# Patient Record
Sex: FEMALE | Race: WHITE | NOT HISPANIC OR LATINO | Employment: UNEMPLOYED | URBAN - METROPOLITAN AREA
[De-identification: names, ages, dates, MRNs, and addresses within clinical notes are randomized per-mention and may not be internally consistent; named-entity substitution may affect disease eponyms.]

---

## 2017-11-14 ENCOUNTER — HOSPITAL ENCOUNTER (OUTPATIENT)
Facility: HOSPITAL | Age: 57
Setting detail: OBSERVATION
Discharge: HOME | End: 2017-11-15
Attending: EMERGENCY MEDICINE | Admitting: INTERNAL MEDICINE

## 2017-11-14 ENCOUNTER — APPOINTMENT (EMERGENCY)
Dept: CT IMAGING | Facility: HOSPITAL | Age: 57
End: 2017-11-14

## 2017-11-14 DIAGNOSIS — N13.30 HYDRONEPHROSIS OF LEFT KIDNEY: ICD-10-CM

## 2017-11-14 DIAGNOSIS — N20.1 LEFT URETERAL STONE: Primary | ICD-10-CM

## 2017-11-14 PROBLEM — Z72.0 TOBACCO ABUSE: Status: ACTIVE | Noted: 2017-11-14

## 2017-11-14 PROBLEM — D72.829 LEUCOCYTOSIS: Status: ACTIVE | Noted: 2017-11-14

## 2017-11-14 PROBLEM — R10.9 ABDOMINAL PAIN: Status: ACTIVE | Noted: 2017-11-14

## 2017-11-14 PROBLEM — E87.1 HYPONATREMIA: Status: ACTIVE | Noted: 2017-11-14

## 2017-11-14 LAB
ALBUMIN SERPL BCP-MCNC: 3.6 G/DL (ref 3.5–5)
ALP SERPL-CCNC: 80 U/L (ref 46–116)
ALT SERPL W P-5'-P-CCNC: 36 U/L (ref 12–78)
ANION GAP SERPL CALCULATED.3IONS-SCNC: 8 MMOL/L (ref 4–13)
AST SERPL W P-5'-P-CCNC: 20 U/L (ref 5–45)
BACTERIA UR QL AUTO: ABNORMAL /HPF
BASOPHILS # BLD AUTO: 0.02 THOUSANDS/ΜL (ref 0–0.1)
BASOPHILS NFR BLD AUTO: 0 % (ref 0–1)
BILIRUB SERPL-MCNC: 0.3 MG/DL (ref 0.2–1)
BILIRUB UR QL STRIP: NEGATIVE
BUN SERPL-MCNC: 13 MG/DL (ref 5–25)
CALCIUM SERPL-MCNC: 9 MG/DL (ref 8.3–10.1)
CHLORIDE SERPL-SCNC: 100 MMOL/L (ref 100–108)
CLARITY UR: CLEAR
CO2 SERPL-SCNC: 27 MMOL/L (ref 21–32)
COLOR UR: YELLOW
CREAT SERPL-MCNC: 0.98 MG/DL (ref 0.6–1.3)
EOSINOPHIL # BLD AUTO: 0.07 THOUSAND/ΜL (ref 0–0.61)
EOSINOPHIL NFR BLD AUTO: 0 % (ref 0–6)
ERYTHROCYTE [DISTWIDTH] IN BLOOD BY AUTOMATED COUNT: 12.3 % (ref 11.6–15.1)
GFR SERPL CREATININE-BSD FRML MDRD: 64 ML/MIN/1.73SQ M
GLUCOSE SERPL-MCNC: 116 MG/DL (ref 65–140)
GLUCOSE UR STRIP-MCNC: NEGATIVE MG/DL
HCT VFR BLD AUTO: 47.1 % (ref 34.8–46.1)
HGB BLD-MCNC: 15.7 G/DL (ref 11.5–15.4)
HGB UR QL STRIP.AUTO: ABNORMAL
KETONES UR STRIP-MCNC: NEGATIVE MG/DL
LACTATE SERPL-SCNC: 1.2 MMOL/L (ref 0.5–2)
LEUKOCYTE ESTERASE UR QL STRIP: NEGATIVE
LIPASE SERPL-CCNC: 159 U/L (ref 73–393)
LYMPHOCYTES # BLD AUTO: 2.26 THOUSANDS/ΜL (ref 0.6–4.47)
LYMPHOCYTES NFR BLD AUTO: 13 % (ref 14–44)
MCH RBC QN AUTO: 30.1 PG (ref 26.8–34.3)
MCHC RBC AUTO-ENTMCNC: 33.3 G/DL (ref 31.4–37.4)
MCV RBC AUTO: 90 FL (ref 82–98)
MONOCYTES # BLD AUTO: 1.08 THOUSAND/ΜL (ref 0.17–1.22)
MONOCYTES NFR BLD AUTO: 6 % (ref 4–12)
NEUTROPHILS # BLD AUTO: 13.45 THOUSANDS/ΜL (ref 1.85–7.62)
NEUTS SEG NFR BLD AUTO: 81 % (ref 43–75)
NITRITE UR QL STRIP: NEGATIVE
NON-SQ EPI CELLS URNS QL MICRO: ABNORMAL /HPF
PH UR STRIP.AUTO: 5.5 [PH] (ref 4.5–8)
PLATELET # BLD AUTO: 290 THOUSANDS/UL (ref 149–390)
PMV BLD AUTO: 9.5 FL (ref 8.9–12.7)
POTASSIUM SERPL-SCNC: 3.7 MMOL/L (ref 3.5–5.3)
PROT SERPL-MCNC: 7 G/DL (ref 6.4–8.2)
PROT UR STRIP-MCNC: NEGATIVE MG/DL
RBC # BLD AUTO: 5.22 MILLION/UL (ref 3.81–5.12)
RBC #/AREA URNS AUTO: ABNORMAL /HPF
SODIUM SERPL-SCNC: 135 MMOL/L (ref 136–145)
SP GR UR STRIP.AUTO: 1.01 (ref 1–1.03)
UROBILINOGEN UR QL STRIP.AUTO: 0.2 E.U./DL
WBC # BLD AUTO: 16.88 THOUSAND/UL (ref 4.31–10.16)
WBC #/AREA URNS AUTO: ABNORMAL /HPF

## 2017-11-14 PROCEDURE — 36415 COLL VENOUS BLD VENIPUNCTURE: CPT | Performed by: EMERGENCY MEDICINE

## 2017-11-14 PROCEDURE — 81001 URINALYSIS AUTO W/SCOPE: CPT | Performed by: EMERGENCY MEDICINE

## 2017-11-14 PROCEDURE — 99285 EMERGENCY DEPT VISIT HI MDM: CPT

## 2017-11-14 PROCEDURE — 83690 ASSAY OF LIPASE: CPT | Performed by: EMERGENCY MEDICINE

## 2017-11-14 PROCEDURE — 96375 TX/PRO/DX INJ NEW DRUG ADDON: CPT

## 2017-11-14 PROCEDURE — 80053 COMPREHEN METABOLIC PANEL: CPT | Performed by: EMERGENCY MEDICINE

## 2017-11-14 PROCEDURE — 96374 THER/PROPH/DIAG INJ IV PUSH: CPT

## 2017-11-14 PROCEDURE — 85025 COMPLETE CBC W/AUTO DIFF WBC: CPT | Performed by: EMERGENCY MEDICINE

## 2017-11-14 PROCEDURE — 83605 ASSAY OF LACTIC ACID: CPT | Performed by: EMERGENCY MEDICINE

## 2017-11-14 PROCEDURE — 74177 CT ABD & PELVIS W/CONTRAST: CPT

## 2017-11-14 PROCEDURE — 96361 HYDRATE IV INFUSION ADD-ON: CPT

## 2017-11-14 RX ORDER — SODIUM CHLORIDE 9 MG/ML
125 INJECTION, SOLUTION INTRAVENOUS CONTINUOUS
Status: DISCONTINUED | OUTPATIENT
Start: 2017-11-14 | End: 2017-11-15

## 2017-11-14 RX ORDER — ACETAMINOPHEN 325 MG/1
650 TABLET ORAL EVERY 6 HOURS PRN
Status: DISCONTINUED | OUTPATIENT
Start: 2017-11-14 | End: 2017-11-15 | Stop reason: HOSPADM

## 2017-11-14 RX ORDER — TAMSULOSIN HYDROCHLORIDE 0.4 MG/1
0.4 CAPSULE ORAL
Status: DISCONTINUED | OUTPATIENT
Start: 2017-11-14 | End: 2017-11-15 | Stop reason: HOSPADM

## 2017-11-14 RX ORDER — NICOTINE 21 MG/24HR
1 PATCH, TRANSDERMAL 24 HOURS TRANSDERMAL DAILY
Status: DISCONTINUED | OUTPATIENT
Start: 2017-11-15 | End: 2017-11-15 | Stop reason: HOSPADM

## 2017-11-14 RX ORDER — KETOROLAC TROMETHAMINE 30 MG/ML
15 INJECTION, SOLUTION INTRAMUSCULAR; INTRAVENOUS EVERY 6 HOURS PRN
Status: DISCONTINUED | OUTPATIENT
Start: 2017-11-14 | End: 2017-11-15 | Stop reason: HOSPADM

## 2017-11-14 RX ORDER — DOCUSATE SODIUM 100 MG/1
100 CAPSULE, LIQUID FILLED ORAL 2 TIMES DAILY PRN
Status: DISCONTINUED | OUTPATIENT
Start: 2017-11-14 | End: 2017-11-15 | Stop reason: HOSPADM

## 2017-11-14 RX ORDER — ONDANSETRON 2 MG/ML
4 INJECTION INTRAMUSCULAR; INTRAVENOUS EVERY 6 HOURS PRN
Status: DISCONTINUED | OUTPATIENT
Start: 2017-11-14 | End: 2017-11-15 | Stop reason: HOSPADM

## 2017-11-14 RX ORDER — ONDANSETRON 2 MG/ML
4 INJECTION INTRAMUSCULAR; INTRAVENOUS ONCE
Status: COMPLETED | OUTPATIENT
Start: 2017-11-14 | End: 2017-11-14

## 2017-11-14 RX ORDER — CALCIUM CARBONATE 200(500)MG
1000 TABLET,CHEWABLE ORAL DAILY PRN
Status: DISCONTINUED | OUTPATIENT
Start: 2017-11-14 | End: 2017-11-15 | Stop reason: HOSPADM

## 2017-11-14 RX ORDER — FENTANYL CITRATE 50 UG/ML
50 INJECTION, SOLUTION INTRAMUSCULAR; INTRAVENOUS ONCE
Status: COMPLETED | OUTPATIENT
Start: 2017-11-14 | End: 2017-11-14

## 2017-11-14 RX ORDER — MORPHINE SULFATE 2 MG/ML
2 INJECTION, SOLUTION INTRAMUSCULAR; INTRAVENOUS EVERY 4 HOURS PRN
Status: DISCONTINUED | OUTPATIENT
Start: 2017-11-14 | End: 2017-11-15 | Stop reason: HOSPADM

## 2017-11-14 RX ORDER — KETOROLAC TROMETHAMINE 30 MG/ML
15 INJECTION, SOLUTION INTRAMUSCULAR; INTRAVENOUS ONCE
Status: COMPLETED | OUTPATIENT
Start: 2017-11-14 | End: 2017-11-14

## 2017-11-14 RX ADMIN — FENTANYL CITRATE 50 MCG: 50 INJECTION INTRAMUSCULAR; INTRAVENOUS at 16:20

## 2017-11-14 RX ADMIN — TAMSULOSIN HYDROCHLORIDE 0.4 MG: 0.4 CAPSULE ORAL at 19:00

## 2017-11-14 RX ADMIN — SODIUM CHLORIDE 125 ML/HR: 0.9 INJECTION, SOLUTION INTRAVENOUS at 19:00

## 2017-11-14 RX ADMIN — KETOROLAC TROMETHAMINE 15 MG: 30 INJECTION, SOLUTION INTRAMUSCULAR at 17:50

## 2017-11-14 RX ADMIN — CEFAZOLIN SODIUM 1000 MG: 1 SOLUTION INTRAVENOUS at 18:15

## 2017-11-14 RX ADMIN — HYDROMORPHONE HYDROCHLORIDE 0.5 MG: 1 INJECTION, SOLUTION INTRAMUSCULAR; INTRAVENOUS; SUBCUTANEOUS at 20:57

## 2017-11-14 RX ADMIN — ONDANSETRON 4 MG: 2 INJECTION INTRAMUSCULAR; INTRAVENOUS at 16:15

## 2017-11-14 RX ADMIN — IOHEXOL 100 ML: 350 INJECTION, SOLUTION INTRAVENOUS at 17:15

## 2017-11-14 RX ADMIN — SODIUM CHLORIDE 1000 ML: 0.9 INJECTION, SOLUTION INTRAVENOUS at 16:15

## 2017-11-14 NOTE — ED PROVIDER NOTES
History  Chief Complaint   Patient presents with    Abdominal Pain     Pt c/o LLQ abd pain, radiates down LLE and into L flank  Denies urinary S&S but reports multiple episodes of vomiting at home  Denies CP/SOB     59-year-old female presents today complaining of left lower quadrant pain which started last night  States it started in her left flank and radiates down to her left quadrant  Denies previous episodes  Reports vomiting but no diarrhea  No prior abdominal surgeries  No urinary symptoms  History provided by:  Patient  Abdominal Pain   Pain location:  LLQ  Pain quality: aching, bloating and sharp    Pain radiates to:  L flank  Pain severity:  Severe  Onset quality:  Sudden  Duration:  2 days  Timing:  Constant  Progression:  Waxing and waning  Chronicity:  New  Context: not alcohol use, not awakening from sleep, not diet changes, not eating, not laxative use, not medication withdrawal, not previous surgeries, not recent illness, not recent sexual activity, not recent travel, not retching, not sick contacts, not suspicious food intake and not trauma    Relieved by:  None tried  Worsened by:  Nothing  Ineffective treatments:  None tried  Associated symptoms: constipation, nausea and vomiting    Associated symptoms: no anorexia, no belching, no chest pain, no chills, no cough, no diarrhea, no dysuria, no fatigue, no fever, no flatus, no hematemesis, no hematochezia, no hematuria, no melena, no shortness of breath and no sore throat    Risk factors: no alcohol abuse, no aspirin use, not elderly, has not had multiple surgeries, no NSAID use, not obese, not pregnant and no recent hospitalization        None       History reviewed  No pertinent past medical history  History reviewed  No pertinent surgical history  History reviewed  No pertinent family history  I have reviewed and agree with the history as documented      Social History   Substance Use Topics    Smoking status: Current Every Day Smoker     Packs/day: 1 00    Smokeless tobacco: Never Used    Alcohol use No        Review of Systems   Constitutional: Negative for chills, fatigue and fever  HENT: Negative for sore throat  Respiratory: Negative for cough and shortness of breath  Cardiovascular: Negative for chest pain  Gastrointestinal: Positive for abdominal pain, constipation, nausea and vomiting  Negative for anorexia, diarrhea, flatus, hematemesis, hematochezia and melena  Genitourinary: Positive for flank pain  Negative for difficulty urinating, dysuria and hematuria  Musculoskeletal: Negative for back pain  Skin: Negative for pallor, rash and wound  Allergic/Immunologic: Negative for immunocompromised state  Neurological: Negative for headaches  Physical Exam  ED Triage Vitals [11/14/17 1552]   Temperature Pulse Respirations Blood Pressure SpO2   97 5 °F (36 4 °C) 88 18 (!) 175/74 100 %      Temp Source Heart Rate Source Patient Position - Orthostatic VS BP Location FiO2 (%)   Oral Monitor Sitting Right arm --      Pain Score       8           Orthostatic Vital Signs  Vitals:    11/14/17 1552 11/14/17 1652 11/14/17 1747 11/14/17 1759   BP: (!) 175/74 151/70 (!) 179/82 (!) 171/79   Pulse: 88 75 77 80   Patient Position - Orthostatic VS: Sitting Lying Lying Lying       Physical Exam   Constitutional: She is oriented to person, place, and time  She appears well-developed and well-nourished  She appears distressed (anxious, tearful, appears uncomfortable)  HENT:   Head: Normocephalic and atraumatic  Eyes: EOM are normal  Pupils are equal, round, and reactive to light  Neck: Normal range of motion  Neck supple  Cardiovascular: Normal rate and regular rhythm  Pulmonary/Chest: Effort normal and breath sounds normal    Abdominal: Soft  Bowel sounds are normal    Musculoskeletal: Normal range of motion  Neurological: She is alert and oriented to person, place, and time  Skin: Skin is warm and dry  Psychiatric: She has a normal mood and affect  Nursing note and vitals reviewed  ED Medications  Medications   ceFAZolin (ANCEF) IVPB (premix) 1,000 mg (not administered)   ondansetron (ZOFRAN) injection 4 mg (4 mg Intravenous Given 11/14/17 1615)   sodium chloride 0 9 % bolus 1,000 mL (1,000 mL Intravenous New Bag 11/14/17 1615)   fentanyl citrate (PF) 100 MCG/2ML 50 mcg (50 mcg Intravenous Given 11/14/17 1620)   iohexol (OMNIPAQUE) 350 MG/ML injection (MULTI-DOSE) 100 mL (100 mL Intravenous Given 11/14/17 1715)   ketorolac (TORADOL) 30 mg/mL injection 15 mg (15 mg Intravenous Given 11/14/17 1750)       Diagnostic Studies  Results Reviewed     Procedure Component Value Units Date/Time    Urine Microscopic [[id number]]  (Abnormal) Collected:  11/14/17 1648    Lab Status:  Final result Specimen:  Urine from Urine, Clean Catch Updated:  11/14/17 1712     RBC, UA 0-1 (A) /hpf      WBC, UA 0-1 (A) /hpf      Epithelial Cells Occasional /hpf      Bacteria, UA Occasional /hpf     UA w Reflex to Microscopic [[id number]]  (Abnormal) Collected:  11/14/17 1648    Lab Status:  Final result Specimen:  Urine from Urine, Clean Catch Updated:  11/14/17 1656     Color, UA Yellow     Clarity, UA Clear     Specific Gravity, UA 1 015     pH, UA 5 5     Leukocytes, UA Negative     Nitrite, UA Negative     Protein, UA Negative mg/dl      Glucose, UA Negative mg/dl      Ketones, UA Negative mg/dl      Urobilinogen, UA 0 2 E U /dl      Bilirubin, UA Negative     Blood, UA Trace-Intact (A)    Lactic acid, plasma [[id number]]  (Normal) Collected:  11/14/17 1615    Lab Status:  Final result Specimen:  Blood from Arm, Right Updated:  11/14/17 1654     LACTIC ACID 1 2 mmol/L     Narrative:         Result may be elevated if tourniquet was used during collection      CMP [[id number]]  (Abnormal) Collected:  11/14/17 1615    Lab Status:  Final result Specimen:  Blood from Arm, Right Updated:  11/14/17 1651     Sodium 135 (L) mmol/L      Potassium 3 7 mmol/L      Chloride 100 mmol/L      CO2 27 mmol/L      Anion Gap 8 mmol/L      BUN 13 mg/dL      Creatinine 0 98 mg/dL      Glucose 116 mg/dL      Calcium 9 0 mg/dL      AST 20 U/L      ALT 36 U/L      Alkaline Phosphatase 80 U/L      Total Protein 7 0 g/dL      Albumin 3 6 g/dL      Total Bilirubin 0 30 mg/dL      eGFR 64 ml/min/1 73sq m     Narrative:         National Kidney Disease Education Program recommendations are as follows:  GFR calculation is accurate only with a steady state creatinine  Chronic Kidney disease less than 60 ml/min/1 73 sq  meters  Kidney failure less than 15 ml/min/1 73 sq  meters  Lipase [[id number]]  (Normal) Collected:  11/14/17 1615    Lab Status:  Final result Specimen:  Blood from Arm, Right Updated:  11/14/17 1651     Lipase 159 u/L     CBC and differential [[id number]]  (Abnormal) Collected:  11/14/17 1615    Lab Status:  Final result Specimen:  Blood from Arm, Right Updated:  11/14/17 1633     WBC 16 88 (H) Thousand/uL      RBC 5 22 (H) Million/uL      Hemoglobin 15 7 (H) g/dL      Hematocrit 47 1 (H) %      MCV 90 fL      MCH 30 1 pg      MCHC 33 3 g/dL      RDW 12 3 %      MPV 9 5 fL      Platelets 344 Thousands/uL      Neutrophils Relative 81 (H) %      Lymphocytes Relative 13 (L) %      Monocytes Relative 6 %      Eosinophils Relative 0 %      Basophils Relative 0 %      Neutrophils Absolute 13 45 (H) Thousands/µL      Lymphocytes Absolute 2 26 Thousands/µL      Monocytes Absolute 1 08 Thousand/µL      Eosinophils Absolute 0 07 Thousand/µL      Basophils Absolute 0 02 Thousands/µL                  CT abdomen pelvis with contrast   Final Result by Katja Ngo MD (11/14 1769)      8 mm left proximal ureteral obstructing calculus causing severe left hydroureteronephrosis  8 mm left lower renal pole nonobstructing calculus  There is a delayed nephrogram on the left likely from the obstructing calculus   Mild left perinephric    stranding is likely from interstitial edema although I cannot completely exclude a superimposed infection if clinically suspected  Workstation performed: [id number]                    Procedures  Procedures       Phone Contacts  ED Phone Contact    ED Course  ED Course                                MDM  Number of Diagnoses or Management Options  Hydronephrosis of left kidney: new and requires workup  Left ureteral stone: new and requires workup  Diagnosis management comments: 4:13 PM  DDx including but not limited to: appendicitis, gastroenteritis, gastritis, PUD, GERD, hepatitis, pancreatitis, colitis, enteritis, diverticulitis, food poisoning, mesenteric adenitis, mesenteric ischemia, IBD, IBS, ileus, bowel obstruction, volvulus, AAA, cholecystitis, biliary colic, pelvic pathology, renal colic, pyelonephritis, UTI and cardiac etiology  6:06 PM  CT shows 8mm proximal left ureteral stone with hydronephrosis  Spoke with Ally Sim PA-C from urology  Recommends admission, abx, NPO after midnight  Will d/w SLIM for admission  Patient updated regarding the plan  Just got Toradol  Will reevaluate  Discussed with Dr Petra Borja  We had a detailed discussion of the patient's condition and case,  including need for admission  Accepts to his/her service  Bed request/bridging orders placed               Amount and/or Complexity of Data Reviewed  Clinical lab tests: ordered and reviewed  Tests in the medicine section of CPT®: ordered and reviewed  Decide to obtain previous medical records or to obtain history from someone other than the patient: yes  Review and summarize past medical records: yes  Independent visualization of images, tracings, or specimens: yes    Risk of Complications, Morbidity, and/or Mortality  Presenting problems: high  Diagnostic procedures: high  Management options: high      CritCare Time    Disposition  Final diagnoses:   Left ureteral stone   Hydronephrosis of left kidney     Time reflects when diagnosis was documented in both MDM as applicable and the Disposition within this note     Time User Action Codes Description Comment    11/14/2017  6:05 PM Rolf Moreno Add [N20 1] Left ureteral stone     11/14/2017  6:05 PM Noreen Day [N13 30] Hydronephrosis of left kidney       ED Disposition     ED Disposition Condition Comment    Admit  Case was discussed with [facility] and the patient's admission status was agreed to be Admission Status: observation status to the service of Dr Vijay Gee   Follow-up Information    None       Patient's Medications    No medications on file     No discharge procedures on file      ED Provider  Electronically Signed by           Lyle Nathan DO  11/14/17 5461

## 2017-11-14 NOTE — H&P
History and Physical - [address] [facility] Internal Medicine    Patient Information: Duke Monteiro 62 y o  female MRN: [id number]  Unit/Bed#: ED 07 Encounter: [id number]  Admitting Physician: Tripp Perales MD  PCP: No primary care provider on file  Date of Admission:  11/14/17    Assessment/Plan:    Hospital Problem List:     Principal Problem:    Hydronephrosis of left kidney  Active Problems:    Abdominal pain    Ureteric stone    Leucocytosis    Hyponatremia    Tobacco abuse      Plan for the Primary Problem(s):  · Left ureteric stone with  hydronephrosis  · IV hydration  · Tamsulosin  · Urology consult  · Pain control    Plan for Additional Problems:   · Leukocytosis:  Likely reactive  Follow urine cultures  · Hyponatremia:  Mild  Recheck after hydration  · Tobacco abuse:  Counseled on cessation, nicotine patch  · Elevated blood pressure:  Likely due to pain  Monitor for now    VTE Prophylaxis: Enoxaparin (Lovenox)  / sequential compression device   Code Status:  Full code  POLST: There is no POLST form on file for this patient (pre-hospital)    Anticipated Length of Stay:  Patient will be admitted on an Observation basis with an anticipated length of stay of  < 2 midnights  Justification for Hospital Stay:  Hydronephrosis    Total Time for Visit, including Counseling / Coordination of Care: 1 hour  Greater than 50% of this total time spent on direct patient counseling and coordination of care  Chief Complaint:   Abdominal pain    History of Present Illness:    Duke Monteiro is a 62 y o  female who presents with left-sided abdominal pain since last night, which radiates to groin  She could not get comfortable in bed  Came to emergency department for evaluation today  Denies any fevers or chills  No blood noted in urine  No nausea or vomiting  History of kidney stones in the past   Otherwise healthy, does not take any medication       Review of Systems:    Review of Systems  Twelve point review system negative except noted above  Past Medical and Surgical History:     History reviewed  No pertinent past medical history  History reviewed  No pertinent surgical history  Meds/Allergies:    Prior to Admission medications    Not on File     I have reviewed home medications with patient personally  Allergies: Allergies   Allergen Reactions    Percocet [Oxycodone-Acetaminophen] GI Intolerance       Social History:     Marital Status: Single   Occupation:  Retired  Patient Pre-hospital Living Situation:  Lives with   Patient Pre-hospital Level of Mobility:  Independent  Patient Pre-hospital Diet Restrictions:  None  Substance Use History:   History   Alcohol Use No     History   Smoking Status    Current Every Day Smoker    Packs/day: 1 00   Smokeless Tobacco    Never Used     History   Drug Use No       Family History:    non-contributory    Physical Exam:     Vitals:   Blood Pressure: (!) 171/79 (11/14/17 1759)  Pulse: 80 (11/14/17 1759)  Temperature: 97 5 °F (36 4 °C) (11/14/17 1552)  Temp Source: Oral (11/14/17 1552)  Respirations: 18 (11/14/17 1759)  Weight - Scale: 64 2 kg (141 lb 8 6 oz) (11/14/17 1552)  SpO2: 98 % (11/14/17 1759)    Physical Exam     Gen -Patient comfortable at rest  Neck- Supple  No thyromegaly or lymphadenopathy  Lungs-Clear bilaterally without any wheeze or rales   Heart S1-S2, regular rate and rhythm, no murmurs  Abdomen-soft nontender, no organomegaly  Bowel sounds present  Extremities-no cyanosi,  clubbing or edema  Skin- no rash  Neuro-awake alert and oriented         Additional Data:     Lab Results: I have personally reviewed pertinent reports          Results from last 7 days  Lab Units 11/14/17  1615   WBC Thousand/uL 16 88*   HEMOGLOBIN g/dL 15 7*   HEMATOCRIT % 47 1*   PLATELETS Thousands/uL 290   NEUTROS PCT % 81*   LYMPHS PCT % 13*   MONOS PCT % 6   EOS PCT % 0       Results from last 7 days  Lab Units 11/14/17  1615   SODIUM mmol/L 135*   POTASSIUM mmol/L 3 7   CHLORIDE mmol/L 100   CO2 mmol/L 27   BUN mg/dL 13   CREATININE mg/dL 0 98   CALCIUM mg/dL 9 0   TOTAL PROTEIN g/dL 7 0   BILIRUBIN TOTAL mg/dL 0 30   ALK PHOS U/L 80   ALT U/L 36   AST U/L 20   GLUCOSE RANDOM mg/dL 116           Imaging: I have personally reviewed pertinent reports  Ct Abdomen Pelvis With Contrast    Result Date: 11/14/2017  Narrative: CT ABDOMEN AND PELVIS WITH IV CONTRAST INDICATION:  Left flank pain COMPARISON: None  TECHNIQUE:  CT examination of the abdomen and pelvis was performed  Reformatted images were created in axial, sagittal, and coronal planes  Radiation dose length product (DLP) for this visit:  412 mGy-cm   This examination, like all CT scans performed in the [facility], was performed utilizing techniques to minimize radiation dose exposure, including the use of iterative reconstruction and automated exposure control  IV Contrast:  100 mL of iohexol (OMNIPAQUE)     Enteric Contrast:  Enteric contrast was not administered  FINDINGS: ABDOMEN LOWER CHEST:  No significant abnormalities identified in the lower chest  LIVER/BILIARY TREE:  Suspected hepatic steatosis  GALLBLADDER:  No calcified gallstones  No pericholecystic inflammatory change  SPLEEN:  Unremarkable  PANCREAS:  Unremarkable  ADRENAL GLANDS:  Unremarkable  KIDNEYS/URETERS:  8 mm left proximal ureteral obstructing calculus causing severe left hydroureteronephrosis  8 mm left lower renal pole nonobstructing calculus  There is a delayed nephrogram on the left likely from the obstructing calculus  Mild left perinephric stranding is likely from interstitial edema although I cannot completely exclude a superimposed infection  STOMACH AND BOWEL:  Unremarkable  APPENDIX:  No findings to suggest appendicitis  ABDOMINOPELVIC CAVITY:  No ascites or free intraperitoneal air  No lymphadenopathy  VESSELS:  Atherosclerotic changes are present  No evidence of aneurysm   PELVIS REPRODUCTIVE ORGANS: Unremarkable for patient's age  URINARY BLADDER:  Unremarkable  ABDOMINAL WALL/INGUINAL REGIONS:  Unremarkable  OSSEOUS STRUCTURES:  No acute fracture or destructive osseous lesion  Impression: 8 mm left proximal ureteral obstructing calculus causing severe left hydroureteronephrosis  8 mm left lower renal pole nonobstructing calculus  There is a delayed nephrogram on the left likely from the obstructing calculus  Mild left perinephric stranding is likely from interstitial edema although I cannot completely exclude a superimposed infection if clinically suspected  Workstation performed: [id number]       EKG, Pathology, and Other Studies Reviewed on Admission:   · EKG:     Allscripts / Epic Records Reviewed: Yes     ** Please Note: This note has been constructed using a voice recognition system   **

## 2017-11-15 ENCOUNTER — ANESTHESIA (OUTPATIENT)
Dept: PERIOP | Facility: HOSPITAL | Age: 57
End: 2017-11-15

## 2017-11-15 ENCOUNTER — ANESTHESIA EVENT (OUTPATIENT)
Dept: PERIOP | Facility: HOSPITAL | Age: 57
End: 2017-11-15

## 2017-11-15 ENCOUNTER — APPOINTMENT (OUTPATIENT)
Dept: RADIOLOGY | Facility: HOSPITAL | Age: 57
End: 2017-11-15

## 2017-11-15 VITALS
WEIGHT: 140.3 LBS | DIASTOLIC BLOOD PRESSURE: 55 MMHG | OXYGEN SATURATION: 94 % | SYSTOLIC BLOOD PRESSURE: 102 MMHG | TEMPERATURE: 97.2 F | RESPIRATION RATE: 12 BRPM | HEIGHT: 64 IN | HEART RATE: 66 BPM | BODY MASS INDEX: 23.95 KG/M2

## 2017-11-15 PROBLEM — R10.9 ABDOMINAL PAIN: Status: RESOLVED | Noted: 2017-11-14 | Resolved: 2017-11-15

## 2017-11-15 LAB
ANION GAP SERPL CALCULATED.3IONS-SCNC: 9 MMOL/L (ref 4–13)
BUN SERPL-MCNC: 13 MG/DL (ref 5–25)
CALCIUM SERPL-MCNC: 8.7 MG/DL (ref 8.3–10.1)
CHLORIDE SERPL-SCNC: 106 MMOL/L (ref 100–108)
CO2 SERPL-SCNC: 24 MMOL/L (ref 21–32)
CREAT SERPL-MCNC: 1.06 MG/DL (ref 0.6–1.3)
ERYTHROCYTE [DISTWIDTH] IN BLOOD BY AUTOMATED COUNT: 12.6 % (ref 11.6–15.1)
GFR SERPL CREATININE-BSD FRML MDRD: 58 ML/MIN/1.73SQ M
GLUCOSE P FAST SERPL-MCNC: 116 MG/DL (ref 65–99)
GLUCOSE SERPL-MCNC: 116 MG/DL (ref 65–140)
HCT VFR BLD AUTO: 41.1 % (ref 34.8–46.1)
HGB BLD-MCNC: 13.2 G/DL (ref 11.5–15.4)
MAGNESIUM SERPL-MCNC: 1.9 MG/DL (ref 1.6–2.6)
MCH RBC QN AUTO: 29.7 PG (ref 26.8–34.3)
MCHC RBC AUTO-ENTMCNC: 32.1 G/DL (ref 31.4–37.4)
MCV RBC AUTO: 92 FL (ref 82–98)
PLATELET # BLD AUTO: 255 THOUSANDS/UL (ref 149–390)
PMV BLD AUTO: 9.7 FL (ref 8.9–12.7)
POTASSIUM SERPL-SCNC: 3.9 MMOL/L (ref 3.5–5.3)
RBC # BLD AUTO: 4.45 MILLION/UL (ref 3.81–5.12)
SODIUM SERPL-SCNC: 139 MMOL/L (ref 136–145)
WBC # BLD AUTO: 12.02 THOUSAND/UL (ref 4.31–10.16)

## 2017-11-15 PROCEDURE — C1769 GUIDE WIRE: HCPCS | Performed by: UROLOGY

## 2017-11-15 PROCEDURE — 85027 COMPLETE CBC AUTOMATED: CPT | Performed by: INTERNAL MEDICINE

## 2017-11-15 PROCEDURE — 87086 URINE CULTURE/COLONY COUNT: CPT | Performed by: NURSE PRACTITIONER

## 2017-11-15 PROCEDURE — 74420 UROGRAPHY RTRGR +-KUB: CPT

## 2017-11-15 PROCEDURE — 80048 BASIC METABOLIC PNL TOTAL CA: CPT | Performed by: INTERNAL MEDICINE

## 2017-11-15 PROCEDURE — 83735 ASSAY OF MAGNESIUM: CPT | Performed by: INTERNAL MEDICINE

## 2017-11-15 PROCEDURE — C2617 STENT, NON-COR, TEM W/O DEL: HCPCS | Performed by: UROLOGY

## 2017-11-15 DEVICE — STENT URETERAL 6FR 24CML INLAY OPTIMA: Type: IMPLANTABLE DEVICE | Site: URETER | Status: FUNCTIONAL

## 2017-11-15 RX ORDER — PROPOFOL 10 MG/ML
INJECTION, EMULSION INTRAVENOUS AS NEEDED
Status: DISCONTINUED | OUTPATIENT
Start: 2017-11-15 | End: 2017-11-15 | Stop reason: SURG

## 2017-11-15 RX ORDER — SODIUM CHLORIDE 9 MG/ML
100 INJECTION, SOLUTION INTRAVENOUS CONTINUOUS
Status: ACTIVE | OUTPATIENT
Start: 2017-11-15 | End: 2017-11-15

## 2017-11-15 RX ORDER — FENTANYL CITRATE/PF 50 MCG/ML
25 SYRINGE (ML) INJECTION
Status: DISCONTINUED | OUTPATIENT
Start: 2017-11-15 | End: 2017-11-15 | Stop reason: HOSPADM

## 2017-11-15 RX ORDER — OXYBUTYNIN CHLORIDE 5 MG/1
5 TABLET ORAL 3 TIMES DAILY
Qty: 90 TABLET | Refills: 1 | Status: ON HOLD | OUTPATIENT
Start: 2017-11-15 | End: 2017-12-04

## 2017-11-15 RX ORDER — KETOROLAC TROMETHAMINE 30 MG/ML
INJECTION, SOLUTION INTRAMUSCULAR; INTRAVENOUS AS NEEDED
Status: DISCONTINUED | OUTPATIENT
Start: 2017-11-15 | End: 2017-11-15 | Stop reason: SURG

## 2017-11-15 RX ORDER — MIDAZOLAM HYDROCHLORIDE 1 MG/ML
INJECTION INTRAMUSCULAR; INTRAVENOUS AS NEEDED
Status: DISCONTINUED | OUTPATIENT
Start: 2017-11-15 | End: 2017-11-15 | Stop reason: SURG

## 2017-11-15 RX ORDER — ONDANSETRON 2 MG/ML
INJECTION INTRAMUSCULAR; INTRAVENOUS AS NEEDED
Status: DISCONTINUED | OUTPATIENT
Start: 2017-11-15 | End: 2017-11-15 | Stop reason: SURG

## 2017-11-15 RX ORDER — FENTANYL CITRATE 50 UG/ML
INJECTION, SOLUTION INTRAMUSCULAR; INTRAVENOUS AS NEEDED
Status: DISCONTINUED | OUTPATIENT
Start: 2017-11-15 | End: 2017-11-15 | Stop reason: SURG

## 2017-11-15 RX ORDER — HYDROCODONE BITARTRATE AND ACETAMINOPHEN 5; 325 MG/1; MG/1
2 TABLET ORAL EVERY 6 HOURS PRN
Qty: 20 TABLET | Refills: 0 | Status: SHIPPED | OUTPATIENT
Start: 2017-11-15 | End: 2017-11-25

## 2017-11-15 RX ORDER — TAMSULOSIN HYDROCHLORIDE 0.4 MG/1
0.4 CAPSULE ORAL
Qty: 30 CAPSULE | Refills: 1 | Status: ON HOLD | OUTPATIENT
Start: 2017-11-15 | End: 2017-12-04

## 2017-11-15 RX ORDER — DOCUSATE SODIUM 100 MG/1
100 CAPSULE, LIQUID FILLED ORAL 3 TIMES DAILY
Qty: 90 CAPSULE | Refills: 0 | Status: ON HOLD | OUTPATIENT
Start: 2017-11-15 | End: 2017-12-04

## 2017-11-15 RX ORDER — LIDOCAINE HYDROCHLORIDE 10 MG/ML
INJECTION, SOLUTION INFILTRATION; PERINEURAL AS NEEDED
Status: DISCONTINUED | OUTPATIENT
Start: 2017-11-15 | End: 2017-11-15 | Stop reason: SURG

## 2017-11-15 RX ADMIN — DEXAMETHASONE SODIUM PHOSPHATE 10 MG: 10 INJECTION INTRAMUSCULAR; INTRAVENOUS at 08:49

## 2017-11-15 RX ADMIN — MIDAZOLAM HYDROCHLORIDE 2 MG: 1 INJECTION, SOLUTION INTRAMUSCULAR; INTRAVENOUS at 08:39

## 2017-11-15 RX ADMIN — ONDANSETRON 4 MG: 2 INJECTION INTRAMUSCULAR; INTRAVENOUS at 08:49

## 2017-11-15 RX ADMIN — ANTACID TABLETS 1000 MG: 500 TABLET, CHEWABLE ORAL at 00:50

## 2017-11-15 RX ADMIN — FENTANYL CITRATE 50 MCG: 50 INJECTION INTRAMUSCULAR; INTRAVENOUS at 08:49

## 2017-11-15 RX ADMIN — LIDOCAINE HYDROCHLORIDE 50 MG: 10 INJECTION, SOLUTION INFILTRATION; PERINEURAL at 08:43

## 2017-11-15 RX ADMIN — KETOROLAC TROMETHAMINE 15 MG: 30 INJECTION, SOLUTION INTRAMUSCULAR at 08:55

## 2017-11-15 RX ADMIN — CEFAZOLIN SODIUM 1000 MG: 1 SOLUTION INTRAVENOUS at 08:41

## 2017-11-15 RX ADMIN — PROPOFOL 200 MG: 10 INJECTION, EMULSION INTRAVENOUS at 08:43

## 2017-11-15 RX ADMIN — SODIUM CHLORIDE 100 ML/HR: 0.9 INJECTION, SOLUTION INTRAVENOUS at 09:59

## 2017-11-15 RX ADMIN — KETOROLAC TROMETHAMINE 15 MG: 30 INJECTION, SOLUTION INTRAMUSCULAR at 00:49

## 2017-11-15 RX ADMIN — ENOXAPARIN SODIUM 40 MG: 40 INJECTION SUBCUTANEOUS at 10:02

## 2017-11-15 NOTE — OP NOTE
OPERATIVE REPORT  PATIENT NAME: Zia Chapman    :  1960  MRN: [id number]  Pt Location: AN OR ROOM 01    SURGERY DATE: 11/15/2017    Surgeon(s) and Role:     * Lena Saenz MD - Primary    Preop Diagnosis:  Left ureteral stone [N20 1]  Left hydronephrosis, left perinephric stranding  Post-Op Diagnosis Codes:     * Left ureteral stone [N20 1]  Left hydronephrosis, left perinephric stranding  Procedure(s) (LRB):  CYSTOSCOPY, RETROGRADE PYELOGRAM AND INSERTION STENT URETERAL (Left)    Specimen(s):  ID Type Source Tests Collected by Time Destination   A :  Urine Urine, Renal, Left URINE CULTURE Lena Saenz MD 11/15/2017 3022        Estimated Blood Loss:   Minimal    Drains:  6 Palestinian by 24 centimeter left ureteral stent       Anesthesia Type:   General    Operative Indications:  Left ureteral stone [N20 1]  Left perinephric stranding, left severe hydronephrosis, left flank pain    Operative Findings:  Cystoscopy revealed a normal bladder with orthotopic ureteral orifices  A solo wire was passed up to the level of the renal pelvis which was still opacified from previously administered IV contrast   A renal pelvic culture was taken from the right renal unit  3 milliliters of Omnipaque was injected into the left renal unit to guide stent placement  A 6 Palestinian by 24 centimeter ureteral stent was placed past the stone into the left renal unit and seen to be in good position      Complications:   None    Procedure and Technique:  Operative Note     PROCEDURES PERFORMED:  1) Cystoscopy  2) left retrograde pyelography with fluoroscopic interpretation  3) left ureteral stent placement (6F x 24cm    SURGEON:  Lena Saenz MD    ASSISTANTS:  None    NOTE:  There were no qualified teaching residents to assist with this case    ANESTHESIA: General     COMPLICATIONS:   None    ANTIBIOTICS:  Cefazolin    INTRAOPERATIVE THROMBOEMBOLISM PROPHYLAXIS:  Pneumatic compression stockings     RADIOLOGIC FINDINGS:    1  Retrograde pyelogram was performed on the left side using a 5 Fr open ended catheter  3 of 50% dilute contrast was injected  2  The following findings were noted: Severe hydroureteronephrosis  Massively dilated calyces  No filling defects  Contrast very slowly drained out of the collecting system  INDICATIONS FOR PROCEDURE:  Lianet Durham is an 62 y o  old female with a left hydronephrosis, and 2 stones 1 in the proximal left ureter and 1 in the left lower pole of the kidney  After discussing the options, the patient elected to undergo ureteral stent placement  We discussed the procedure in detail, the alternatives, and the risks, and they signed informed consent to proceed  PROCEDURE IN DETAIL:   The patient was identified and brought to the OR  Antibiotic prophylaxis and DVT prophylaxis were administered  They were placed in the comfortable dorsal lithotomy position with care to pad all pressure points  They were prepped and draped in the usual sterile fashion using hibiclens  A surgical time out was performed with all in the room in agreement with the correct patient, procedure, indications, and laterality  A 21-Upper sorbian rigid cystoscope was used to enter the bladder  The bladder was inspected in its entirety and there were no lesions noted  The ureteral orifices were identified in their orthotopic positions  The left ureteral orifice was identified and a 5 Fr open ended catheter was placed into the ureteral orifice  The stone was not visible on  fluoroscopic guidance  A retrograde pyelogram was performed with injection of 50/50 Omnipaque which demonstrated severe hydroureteronephrosis  A solo wire was up to the kidney under fluoroscopic guidance  A left 6 x 24 centimeter JJ stent was then passed up the wire  under fluoroscopic guidance into the left  kidney with a good curl noted in the kidney and in the bladder  No string was left    The bladder was drained  The patient was placed back in the supine position, awakened from general anesthesia and brought to the recovery room in stable condition  SPECIMENS:     Order Name Source Comment Collection Info Order Time   URINE CULTURE Urine, Renal, Left  Collected By: Elvin Phillips MD 11/15/2017  8:52 AM        IMPLANTS:     Implant Name Type Inv  Item Serial No   Lot No  LRB No  Used   URETERAL STENT 6 FR X 24 CM OPTIMA INLAY - [id number]   URETERAL STENT 6 FR X 24 CM OPTIMA INLAY   [facility] [employer] [facility] [id number] Left 1        COMPLICATIONS:  None    DISPOSITION: PACU     PLAN:  The patient has tolerated left ureteral stent placement for decompression of a severely hydronephrotic left kidney in the setting of proximal left ureteral stone  She may be discharged home later today  She should follow up with our office in 1 week to schedule a future ureteroscopy with laser lithotripsy for treatment of her ureteral and kidney stones  My office will set this up  No further urologic intervention at this time, the patient remained under the care of the [address] [place] [address] Internal Medicine service       I was present for the entire procedure    Patient Disposition:  PACU     SIGNATURE: Elvin Phillips MD  DATE: November 15, 2017  TIME: 8:58 AM

## 2017-11-15 NOTE — PROGRESS NOTES
Pt has no insurance and was wondered about paying for her prescriptions  We called [facility] and gave her prices  We also looked at [facility] for her  We sent prescriptions down to [facility] pharmacy as well  Patient didn't want to wait for the [facility] pharmacy amounts  So she left with the prescriptions and is going to go to [facility]

## 2017-11-15 NOTE — DISCHARGE INSTRUCTIONS
Ureteral Stent Placement   Karissa Cevallos,    Today you had a cystoscopy and a left ureteral stent placement for decompression of your left kidney (a small flexible plastic tube was placed passed her stone into your kidney and is now draining into the bladder)  It is important for you to know that the stent cannot remain in long-term and if you do not follow up with us in the clinic and keep your stent for longer than recommended, he may permanently damage or kidney and form more stones along the course of the stent  This is known as a retained or for gotten stent and it is very important to avoid this  Going forward, I will have my office reach out to you about following up with us in the clinic in approximately 1 week to discuss scheduling your procedure for definitive stone removal and destruction  I will also recommend that my office refer you to financial services to discuss your concerns about any charges that you may incur from treatment of your kidney stones  It is normal for you to have blood in your urine for up to a number of weeks  We only worry of your passing large blood clots or if you are unable to pass her urine or if your urine looks like dark red wine  If you have fevers, chills, malaise (feeling like to have the flu) please call our office or go to the emergency room  If you have any questions regarding your case or future management please do not hesitate to call our office or write these questions down and they can be discussed at your office visit  I wish you well and we will see you in the office,  Dr Estelita Reynolds, Urology    WHAT YOU NEED TO KNOW:   Ureteral stent placement is a procedure to open a blocked or narrow ureter  The ureter is the tube that carries urine from your kidney into your bladder  A stent is a thin hollow plastic tube used to hold your ureter open and allow urine to flow  The stent may stay in for several weeks     DISCHARGE INSTRUCTIONS:   Medicines:   · Pain medicine  may be given to take away or decrease pain  Do not wait until the pain is severe before you take your medicine  · Antibiotics  help prevent infections  Your healthcare provider may prescribe these for you while your stent remains in  · Take your medicine as directed  Contact your healthcare provider if you think your medicine is not helping or if you have side effects  Tell him or her if you are allergic to any medicine  Keep a list of the medicines, vitamins, and herbs you take  Include the amounts, and when and why you take them  Bring the list or the pill bottles to follow-up visits  Carry your medicine list with you in case of an emergency  Follow up with your urologist as directed: You will need regular follow-up visits with your urologist as long as the stent remains in  He will check to make sure the stent is working properly  He may do urine cultures to check for infection  Write down your questions so you remember to ask them during your visits  Self-care:   · Drink liquids  as directed  Ask your healthcare provider how much liquid to drink each day and which liquids are best for you  Fluids such as cranberry or apple juice may be especially helpful to prevent urinary infections  · Return to normal activities  the day after your stent placement or as directed by your healthcare provider  · You may take a shower  the day after your stent placement if your healthcare provider says it is okay  Contact your healthcare provider or urologist if:   · You have a fever or chills  · You feel like you need to urinate often  · You have pain when you urinate or pain around your bladder or kidney  · You see blood in your urine or it looks cloudy  · You have questions or concerns about your condition or care  Seek care immediately or call 911 if:   · You urinate little or not at all  · You have severe pain in your abdomen    © 2017 [employer] Information is for End User's use only and may not be sold, redistributed or otherwise used for commercial purposes  All illustrations and images included in CareNotes® are the copyrighted property of A D [employer] M [employer] Inc  or [employer]  The above information is an  only  It is not intended as medical advice for individual conditions or treatments  Talk to your doctor, nurse or pharmacist before following any medical regimen to see if it is safe and effective for you

## 2017-11-15 NOTE — CASE MANAGEMENT
Initial Clinical Review    Admission: Date/Time/Statement: 11/14/2017  1810 OBSERVATION    Orders Placed This Encounter   Procedures    Place in Observation (expected length of stay for this patient is less than two midnights)     Standing Status:   Standing     Number of Occurrences:   1     Order Specific Question:   Admitting Physician     Answer:   Bi Fisher     Order Specific Question:   Level of Care     Answer:   Med Surg [16]         ED: Date/Time/Mode of Arrival:   ED Arrival Information     Expected Arrival Acuity Means of Arrival Escorted By Service Admission Type    - 11/14/2017 15:47 Urgent Walk-In Self General Medicine Urgent    Arrival Complaint    abdominal pain          Chief Complaint:   Chief Complaint   Patient presents with    Abdominal Pain     Pt c/o LLQ abd pain, radiates down LLE and into L flank  Denies urinary S&S but reports multiple episodes of vomiting at home  Denies CP/SOB       History of Illness: 62 y o  female who presents with left-sided abdominal pain since last night, which radiates to groin  She could not get comfortable in bed  Came to emergency department for evaluation today  Denies any fevers or chills  No blood noted in urine  No nausea or vomiting  History of kidney stones in the past   Otherwise healthy, does not take any medication  ED Vital Signs:   ED Triage Vitals [11/14/17 1552]   Temperature Pulse Respirations Blood Pressure SpO2   97 5 °F (36 4 °C) 88 18 (!) 175/74 100 %      Temp Source Heart Rate Source Patient Position - Orthostatic VS BP Location FiO2 (%)   Oral Monitor Sitting Right arm --      Pain Score       8        Wt Readings from Last 1 Encounters:   11/14/17 63 6 kg (140 lb 4 8 oz)       Vital Signs (abnormal): /74 - 171/79  Exam - distressed  Abnormal Labs/Diagnostic Test Results:   UA trace blood  Na 135     Wbc 16 88, hgb 15 7,  hct 47 1     Ct abdomen - 8 mm left proximal ureteral obstructing calculus causing severe left hydroureteronephrosis  8 mm left lower renal pole nonobstructing calculus  There is a delayed nephrogram on the left likely from the obstructing calculus  Mild left perinephric   stranding is likely from interstitial edema although I cannot completely exclude a superimposed infection if clinically suspected  Labs am 11/15- glucose 116  Wbc 12 02    ED Treatment:   Medication Administration from 11/14/2017 1547 to 11/14/2017 1954       Date/Time Order Dose Route Action Action by Comments     11/14/2017 1615 ondansetron (ZOFRAN) injection 4 mg 4 mg Intravenous Given Micha Jeff RN      11/14/2017 1855 sodium chloride 0 9 % bolus 1,000 mL 0 mL Intravenous Stopped Micha Jeff RN      11/14/2017 1615 sodium chloride 0 9 % bolus 1,000 mL 1,000 mL Intravenous New Bag Mariella Mcknight RN      11/14/2017 1620 fentanyl citrate (PF) 100 MCG/2ML 50 mcg 50 mcg Intravenous Given Mariella Mcknight RN      11/14/2017 1715 iohexol (OMNIPAQUE) 350 MG/ML injection (MULTI-DOSE) 100 mL 100 mL Intravenous Given Shirley Thorne      11/14/2017 1750 ketorolac (TORADOL) 30 mg/mL injection 15 mg 15 mg Intravenous Given Micha Jeff RN      11/14/2017 1850 ceFAZolin (ANCEF) IVPB (premix) 1,000 mg 0 mg Intravenous Stopped Micha Jeff RN      11/14/2017 1815 ceFAZolin (ANCEF) IVPB (premix) 1,000 mg 1,000 mg Intravenous New Bob Jeff RN      11/14/2017 1900 sodium chloride 0 9 % infusion 125 mL/hr Intravenous New Bob Jeff RN      11/14/2017 1900 tamsulosin (FLOMAX) capsule 0 4 mg 0 4 mg Oral Given Micha Jeff RN           Past Medical/Surgical History:    Active Ambulatory Problems     Diagnosis Date Noted    No Active Ambulatory Problems     Resolved Ambulatory Problems     Diagnosis Date Noted    No Resolved Ambulatory Problems     Past Medical History:   Diagnosis Date    Kidney stone        Admitting Diagnosis: Abdominal pain [R10 9]  Hydronephrosis of left kidney [N13 30]  Left ureteral stone [N20 1]    Age/Sex: 62 y o  female  Assessment/Plan: Left ureteric stone with  hydronephrosis  ? IV hydration  ? Tamsulosin  ? Urology consult  ? Pain control     Plan for Additional Problems:   · Leukocytosis:  Likely reactive  Follow urine cultures  · Hyponatremia:  Mild  Recheck after hydration  · Tobacco abuse:  Counseled on cessation, nicotine patch  Elevated blood pressure:  Likely due to pain   Monitor for now    Admission Orders:  11/14/2017  1810 OBSERVATION  Scheduled Meds:   enoxaparin 40 mg Subcutaneous Daily   nicotine 1 patch Transdermal Daily   tamsulosin 0 4 mg Oral Daily With Dinner     Continuous Infusions:   sodium chloride 125 mL/hr Last Rate: 125 mL/hr (11/14/17 1900)     PRN Meds:   acetaminophen    calcium carbonate - used x 1      docusate sodium    HYDROmorphone 0 5 iv - used x 1      Ketorolac 15 mg iv - used x 1      morphine injection    ondansetron    OTHER ORDERS:  scds  NPO  Consult urology

## 2017-11-15 NOTE — ANESTHESIA POSTPROCEDURE EVALUATION
Post-Op Assessment Note      CV Status:  Stable    Mental Status:  Somnolent    Hydration Status:  Stable    PONV Controlled:  None    Airway Patency:  Patent    Post Op Vitals Reviewed: Yes          Staff: MEGAN           BP (!) (P) 85/45 (11/15/17 0905)    Temp (P) 97 6 °F (36 4 °C) (11/15/17 0905)    Pulse (P) 67 (11/15/17 0905)   Resp (P) 16 (11/15/17 0905)    SpO2 (P) 96 % (11/15/17 0905)

## 2017-11-15 NOTE — CONSULTS
UROLOGY CONSULTATION NOTE     Patient Identifiers: Leighann Liu (MRN [id number])  Service Requesting Consultation:  Internal Medicine  Service Providing Consultation:  Urology, Jeaneth Wilkes MD    Date of Service: 11/15/2017  Consults    Reason for Consultation:  Left flank pain, left proximal ureteral stone, left perinephric stranding    History of Present Illness:     Leighann Liu is a 62 y o  old with a history of kidney stone many years ago, history of being uninsured, with left-sided abdominal pain radiating to her groin  She did come to [address] [place] [address] Emergency Room for this reason and was found to have a left proximal ureteral stone which appeared to be impacted, severe hydronephrosis with a delayed left nephrogram, and left perinephric stranding and perinephric fluid consistent with potential ruptured fornix versus superimposed infection versus severe obstruction of the left renal unit  She denied vomiting but did have some nausea yesterday  She denies history of urologic instrumentation or urologic visits in the office, or urologic malignancy or malady  She was found to be hypertensive in the emergency room and she was found to have a white blood cell count of 16 8 with a creatinine of 0 98  She was admitted to the [address] [place] [address] Internal Medicine service for IV hydration, empiric antibiosis, pain control, and anti medic administration  A urology consultation was called for the above nephrolithiasis findings  She does express significant anxiety about the charges she will face from this hospitalization as she is not ensured from a medical insurance standpoint  Past Medical, Past Surgical History:     Past Medical History:   Diagnosis Date    Kidney stone     24 years ago   :    History reviewed   No pertinent surgical history :    Medications, Allergies:     Current Facility-Administered Medications   Medication Dose Route Frequency    acetaminophen (TYLENOL) tablet 650 mg  650 mg Oral Q6H PRN    calcium carbonate (TUMS) chewable tablet 1,000 mg  1,000 mg Oral Daily PRN    docusate sodium (COLACE) capsule 100 mg  100 mg Oral BID PRN    enoxaparin (LOVENOX) subcutaneous injection 40 mg  40 mg Subcutaneous Daily    HYDROmorphone (DILAUDID) 1 mg/mL injection 0 5 mg  0 5 mg Intravenous Q4H PRN    ketorolac (TORADOL) 30 mg/mL injection 15 mg  15 mg Intravenous Q6H PRN    morphine injection 2 mg  2 mg Intravenous Q4H PRN    nicotine (NICODERM CQ) 14 mg/24hr TD 24 hr patch 1 patch  1 patch Transdermal Daily    ondansetron (ZOFRAN) injection 4 mg  4 mg Intravenous Q6H PRN    sodium chloride 0 9 % infusion  125 mL/hr Intravenous Continuous    tamsulosin (FLOMAX) capsule 0 4 mg  0 4 mg Oral Daily With Dinner       Allergies: Allergies   Allergen Reactions    Percocet [Oxycodone-Acetaminophen] GI Intolerance   :    Social and Family History:   Social History:   Social History   Substance Use Topics    Smoking status: Current Every Day Smoker     Packs/day: 1 00    Smokeless tobacco: Never Used    Alcohol use No        History   Smoking Status    Current Every Day Smoker    Packs/day: 1 00   Smokeless Tobacco    Never Used       Family History:  History reviewed  No pertinent family history :     Review of Systems:     General: Fever, chills, or night sweats: negative  Cardiac: Negative for chest pain  Pulmonary: Negative for shortness of breath  Gastrointestinal: Abdominal pain positive  Nausea, vomiting, or diarrhea positive,  Genitourinary: See HPI above  Patient does not have hematuria  All other systems queried were negative      Physical Exam:   General: Patient is alert and oriented x3, normal affect, although patient is anxious and frustrated that the stone cannot be taking care of today  /67   Pulse 78   Temp 98 °F (36 7 °C) (Temporal)   Resp 18   Ht 5' 4" (1 626 m)   Wt 63 6 kg (140 lb 4 8 oz)   SpO2 95%   BMI 24 08 kg/m² Temp (24hrs), Av 1 °F (36 7 °C), Min:97 5 °F (36 4 °C), Max:98 5 °F (36 9 °C)  current; Temperature: 98 °F (36 7 °C)  I/O last 24 hours: In: 1050 [IV Piggyback:1050]  Out: 450 [Urine:450]  Cardiac: Peripheral edema: negative  Pulmonary: Non-labored breathing  Abdomen: Soft, non-tender, non-distended  No surgical scars  No masses, tenderness, hernias noted  Genitourinary: Positive CVA tenderness left, negative suprapubic tenderness  Extremities:  Warm and symmetrical  Neurological:  No gross deficits, cranial nerves are intact  Psychiatric:  Patient does not have any suicidal ideation, does have significant anxiety and spare about potential charges from this hospitalization        ANN: none        Labs:     Lab Results   Component Value Date    HGB 13 2 11/15/2017    HCT 41 1 11/15/2017    WBC 12 02 (H) 11/15/2017     11/15/2017   ]    Lab Results   Component Value Date     11/15/2017    K 3 9 11/15/2017     11/15/2017    CO2 24 11/15/2017    BUN 13 11/15/2017    CREATININE 1 06 11/15/2017    CALCIUM 8 7 11/15/2017    GLUCOSE 116 11/15/2017   ]    Imaging:   I personally reviewed the images and report of the following studies, and reviewed them with the patient:    CT Abdomen/Pelvis: CT abdomen pelvis was reviewed by me personally and shows a proximal right ureteral stone with associated delayed nephrogram, severe hydronephrosis with perinephric stranding and potentially perinephric fluid signifying potential ruptured fornix, additionally there is a equal sized stone in the lower pole of left kidney  ASSESSMENT:     62 y o  old female with  leukocytosis, left flank pain and abdominal pain, and proximal left ureteral stone with severe hydronephrosis and delayed nephrogram    I discussed with the patient her findings and my concern for ruptured fornix versus superimposed infection given her leukocytosis and imaging findings    I did express to her that she will need a future procedure for ureteroscopy and laser lithotripsy for definitive treatment of her nephrolithiasis  It is unsafe to perform a ureteroscopy in this setting as superimposed infection is suspected and as the patient appears to have a ruptured fornix with extravasation of urine or inflammatory fluid around the left kidney  In the setting the safest course of action is perform a cystoscopy and left ureteral stent placement with a return trip to the operating room for definitive stone management  PLAN:     Patient is NPO, informed consent for the proposed procedure has been obtained  Proceed with cystoscopy and left ureteral stent placement    Following this, the patient will follow up with our office in approximately 1 week to schedule a left ureteroscopy, laser lithotripsy, left ureteral stone basketing, and left ureteral stent placement for definitive stone management    Pending a smooth operative course today, the patient should be discharged home later today with a plan for outpatient follow-up        Thank you for allowing me to participate in this patients care  Please do not hesitate to call with any additional questions    Elvin Phillips MD

## 2017-11-15 NOTE — DISCHARGE SUMMARY
Discharge Summary - [facility] [facility] Internal [facility]    Patient Information: Ju Nelson 62 y o  female MRN: [id number]  Unit/Bed#: -01 Encounter: [id number]    Discharging Physician / Practitioner: Brent Green PA-C  PCP: No primary care provider on file  Admission Date: 11/14/2017  Discharge Date: 11/15/17    Reason for Admission:  Kidney stone    Discharge Diagnoses:     Principal Problem:    Left ureteral stone  Active Problems:    Hydronephrosis of left kidney    Hyponatremia    Tobacco abuse  Resolved Problems:    Abdominal pain      Consultations During Hospital Stay:  · Urology    Procedures Performed:     · November 14, 2017 CT scan of the abdomen and pelvis= 8 mm left proximal ureteral obstructing calculus causing severe left hydroureteronephrosis  8 mm left lower renal pole nonobstructing calculus  There is a delayed nephrogram on the left likely from the obstructing calculus  Mild left perinephric stranding is likely from interstitial edema although I cannot completely exclude a superimposed infection if clinically suspected  ·  November 15, 2017= cystoscopy, retrograde pyelogram with stent    Significant Findings / Test Results:     · See above    Incidental Findings:   · Mild hyponatremia --resolved    Test Results Pending at Discharge (will require follow up):   · Urine culture     Outpatient Tests Requested:  · none    Complications:  Lack of health insurance and no family doctor    Hospital Course:     Ju Nelson is a 62 y o  female patient who originally presented to the hospital on 11/14/2017 due to left-sided abdominal pain  CT scan of the abdomen and pelvis confirmed an 8 mm stone with hydroureteronephrosis  She was seen in consultation by Urology  On November 15 she underwent cystoscopy with retrograde pyelogram and placement of stent  She was provided with prescriptions for Flomax, oxybutynin, and Vicodin for pain with stool softener    She had been given aggressive IV fluids and her pain has resolved at this time  She was advised to follow up with Urology as an outpatient and was deemed to be stable for discharge today  Condition at Discharge: stable     Discharge Day Visit / Exam:     Subjective:  Patient is asking when she can go home  She reports she has no pain at this time and is feeling well  She denies any fevers, chills, or other concerns overnight  Vitals: Blood Pressure: 102/55 (11/15/17 0930)  Pulse: 66 (11/15/17 0930)  Temperature: (!) 97 2 °F (36 2 °C) (11/15/17 0930)  Temp Source: Temporal (11/15/17 0759)  Respirations: 12 (11/15/17 0930)  Height: 5' 4" (162 6 cm) (11/14/17 1954)  Weight - Scale: 63 6 kg (140 lb 4 8 oz) (11/14/17 1954)  SpO2: 94 % (11/15/17 0930)  Exam:   Physical Exam   Constitutional: She appears well-developed and well-nourished  No distress  Sitting up in bed eating breakfast comfortably   HENT:   Head: Normocephalic and atraumatic  Eyes: Conjunctivae are normal  Right eye exhibits no discharge  Left eye exhibits no discharge  No scleral icterus  Neck: Neck supple  Cardiovascular: Normal rate, regular rhythm and normal heart sounds  No murmur heard  Pulmonary/Chest: Effort normal and breath sounds normal  No respiratory distress  She has no wheezes  She has no rales  Abdominal: Soft  Bowel sounds are normal  She exhibits no distension  There is no tenderness  There is no rebound  Musculoskeletal: She exhibits no edema  Neurological: She is alert  Awake alert and interactive   Skin: Skin is warm and dry  No rash noted  She is not diaphoretic  No erythema  No pallor  Psychiatric: She has a normal mood and affect  Her behavior is normal  Thought content normal    Nursing note and vitals reviewed  Discussion with Family:  Family at bedside    Discharge instructions/Information to patient and family:   See after visit summary for information provided to patient and family        Provisions for Follow-Up Care:  See after visit summary for information related to follow-up care and any pertinent home health orders  Disposition:     Home    For Discharges to [facility] SNF:   · Not Applicable to this Patient - Not Applicable to this Patient    Planned Readmission: none     Discharge Statement:  I spent 35 minutes discharging the patient  This time was spent on the day of discharge  I had direct contact with the patient on the day of discharge  Greater than 50% of the total time was spent examining patient, answering all patient questions, arranging and discussing plan of care with patient as well as directly providing post-discharge instructions  Additional time then spent on discharge activities  Bedside nursing rounds performed  Case discussed with my attending  Discharge Medications:  See after visit summary for reconciled discharge medications provided to patient and family        ** Please Note: This note has been constructed using a voice recognition system **

## 2017-11-15 NOTE — ANESTHESIA PREPROCEDURE EVALUATION
Review of Systems/Medical History          Cardiovascular  Negative cardio ROS    Pulmonary  Negative pulmonary ROS ,        GI/Hepatic  Negative GI/hepatic ROS          Kidney stones,        Endo/Other  Negative endo/other ROS      GYN       Hematology  Negative hematology ROS      Musculoskeletal  Negative musculoskeletal ROS        Neurology  Negative neurology ROS      Psychology           Physical Exam    Airway    Mallampati score: III  TM Distance: >3 FB  Neck ROM: full     Dental       Cardiovascular  Comment: Negative ROS,     Pulmonary      Other Findings        Anesthesia Plan  ASA Score- 2       Anesthesia Type- general with ASA Monitors  Additional Monitors:   Airway Plan: LMA  Comment: General anesthesia, LMA; standard ASA monitors  Risks and benefits discussed with patient; patient consented and agrees to proceed  I saw and evaluated the patient  If seen with CRNA, we have discussed the anesthetic plan and I am in agreement that the plan is appropriate for the patient  Postmenopausal for many years   Induction- intravenous  Informed Consent- Anesthetic plan and risks discussed with patient

## 2017-11-17 LAB — BACTERIA UR CULT: NORMAL

## 2017-11-22 ENCOUNTER — ALLSCRIPTS OFFICE VISIT (OUTPATIENT)
Dept: OTHER | Facility: OTHER | Age: 57
End: 2017-11-22

## 2017-11-22 DIAGNOSIS — N20.1 CALCULUS OF URETER: ICD-10-CM

## 2017-11-22 DIAGNOSIS — R30.0 DYSURIA: ICD-10-CM

## 2017-11-22 LAB
CLARITY UR: NORMAL
COLOR UR: NORMAL
GLUCOSE (HISTORICAL): NORMAL
HGB UR QL STRIP.AUTO: NORMAL
KETONES UR STRIP-MCNC: NORMAL MG/DL
LEUKOCYTE ESTERASE UR QL STRIP: NORMAL
NITRITE UR QL STRIP: NORMAL
PH UR STRIP.AUTO: 5 [PH]
PROT UR STRIP-MCNC: NORMAL MG/DL
SP GR UR STRIP.AUTO: 1.02

## 2017-11-23 ENCOUNTER — APPOINTMENT (OUTPATIENT)
Dept: LAB | Facility: HOSPITAL | Age: 57
End: 2017-11-23

## 2017-11-23 DIAGNOSIS — N20.1 CALCULUS OF URETER: ICD-10-CM

## 2017-11-23 DIAGNOSIS — R30.0 DYSURIA: ICD-10-CM

## 2017-11-23 PROCEDURE — 87086 URINE CULTURE/COLONY COUNT: CPT

## 2017-11-24 LAB — BACTERIA UR CULT: ABNORMAL

## 2017-11-24 NOTE — PROGRESS NOTES
Assessment  1  Left ureteral stone (592 1) (N20 1)   2  Hydronephrosis, left (591) (N13 30)   3  Dysuria (788 1) (R30 0)    Plan  Dysuria    · Pyridium 200 MG Oral Tablet; Take 1 tablet po TID prn   Rx By: Nova Crooks; Dispense: 0 Days ; #:12 Tablet; Refill: 0;Dysuria; CRYSTAL = N; Faxed To: [facility]  Dysuria, Left ureteral stone    · (1) URINE CULTURE; Source:Urine, Clean Catch; Status:Active - Retrospective ByProtocol Authorization; Requested for:22Nov2017;    Perform:[facility]; NAD:[id number]; Last Updated By:Nathalie Jennings; 11/22/2017 12:01:03 PM;Ordered;Left ureteral stone; Ordered By:Enmanuel Dallas; Hydronephrosis, left, Left ureteral stone    · Urine Dip Non-Automated- POC; Status:Complete - Retrospective By ProtocolAuthorization;   Done: [id number] 11:27AM   Performed: In Office; [phone]; Last Updated By:Nathalie Jennings; 11/22/2017 11:28:19 AM;Ordered;left, Left ureteral stone; Ordered By:Enmanuel Dallas; Left ureteral stone    · Hydrocodone-Acetaminophen 5-325 MG Oral Tablet (Norco); TAKE 1 TABLETEVERY 6 HOURS AS NEEDED   Rx By: Nova Crooks; Dispense: 3 Days ; #:10 Tablet; Refill: 0;Left ureteral stone; CRYSTAL = N; Print Rx   · Schedule Surgery Treatment  cystoscopy, left ureteroscopy, holmium laser, basketextraction, retrograde pyelogram, and left ureteral stent insertion -- with Dr Soha Brown  Status: Hold For - Scheduling  Requested for: [id number]   Ordered;Left ureteral stone; Ordered By: Nova Crooks Performed:  Due: [id number]    Discussion/Summary  Discussion Summary:   1  Left ureteral calculus status post cystoscopy, retrograde pyelogram, left ureteral stent (11/15/17) -- managed by Dr Adali Zuniga with the patient that she will need definitive stone management cystoscopy, left ureteroscopy, holmium laser, basket extraction, retrograde pyelogram, and left ureteral stent insertion   Reviewed with the patient the risks of this procedure including but not limited to cardiopulmonary complications, bleeding, infection, damage to nearby structures including ureter/bladder/kidney, need for additional ureteroscopic intervention, possible nephrostomy tube, and additional procedures  Patient verbalized understanding  urine will be sent for culture from the office today to ensure no infection  Encouraged her to continue tamsulosin, oxybutynin, and as stated anti-inflammatories for stent discomfort  Patient provided with prescription for Norco for break through pain  Patient instructed to contact us with fevers or chills  all questions answered  Chief Complaint  Chief Complaint Free Text Note Form: Patient presents for hospital follow up for left ureteral stone and hydronephrosis      History of Present Illness  HPI: Dianelys Neil is a 59-year-old female patient of Dr Juan M Carpenter status post cystoscopy and ureteral stent insertion (11/15/17) presenting for follow-up   presented to the emergency department in regards to left abdominal pain radiating to her groin  A CT obtained at that time revealed left proximal ureteral stone which appeared to be impacted, severe hydronephrosis with a delayed left nephrogram, and left perinephric stranding and perinephric fluid consistent with potential ruptured fornix versus superimposed infection versus severe obstruction of the left renal unit  Given signs of infection patient proceeded to the operating room Dr Carmelo Reaves for cystoscopy, retrograde pyelogram, and left ureteral stent insertion  culture at that same and at that time (11/15/17) was negative for any growth  Urinalysis in the office today reveals negative for leukocytes and nitrites  She has having increased urinary urgency, frequency, nocturia, and dysuria  She denies any nausea, vomiting, fevers, or chills  He is currently being managed with tamsulosin, oxybutynin, breakthrough pain with Norco, and as needed peridium for urinary discomfort        Review of Systems  Complete-Female Urology:  Constitutional: No fever, no chills, feels well, no tiredness, no recent weight gain or weight loss  Respiratory: No complaints of shortness of breath, no wheezing, no cough, no SOB on exertion, no orthopnea, no PND  Cardiovascular: No complaints of slow heart rate, no fast heart rate, no chest pain, no palpitations, no leg claudication, no lower extremity edema  Gastrointestinal: No complaints of abdominal pain, no constipation, no nausea or vomiting, no diarrhea, no bloody stools  Genitourinary: frequency,-- urinary hesitancy,-- feelings of urinary urgency-- and-- stream quality poor, but-- no dysuria,-- no empty sensation,-- no incontinence-- and-- no hematuria--   The patient presents with complaints of nocturia (up to 10 times )  Musculoskeletal: No complaints of arthralgias, no myalgias, no joint swelling or stiffness, no limb pain or swelling  Integumentary: No complaints of skin rash or lesions, no itching, no skin wounds, no breast pain or lump  Hematologic/Lymphatic: No complaints of swollen glands, no swollen glands in the neck, does not bleed easily, does not bruise easily  Neurological: No complaints of headache, no confusion, no convulsions, no numbness, no dizziness or fainting, no tingling, no limb weakness, no difficulty walking  ROS Reviewed:   ROS reviewed  Active Problems  1  Hydronephrosis, left (591) (N13 30)   2  Left ureteral stone (592 1) (N20 1)    Past Medical History  Active Problems And Past Medical History Reviewed: The active problems and past medical history were reviewed and updated today  Surgical History  1  History of Cystoscopy With Insertion Of Ureteral Stent  Surgical History Reviewed: The surgical history was reviewed and updated today  Family History  Mother    1  Family history of cerebrovascular accident (CVA) (V17 1) (Z82 3)  Father    2   Family history of cardiac disorder (V17 49) (Z82 49)  Family History Reviewed: The family history was reviewed and updated today  Social History     · Current every day smoker (305 1) (F17 200)   · No alcohol use   · No illicit drug use   · Unemployed (V62 0) (Z56 0)   ·  (V61 07) (Z63 4)  Social History Reviewed: The social history was reviewed and is unchanged  Current Meds   1  Oxybutynin Chloride 5 MG Oral Tablet; Therapy: (Recorded:22Nov2017) to Recorded   2  Phenazopyridine HCl - 200 MG Oral Tablet; Therapy: (Recorded:22Nov2017) to Recorded   3  Tamsulosin HCl - 0 4 MG Oral Capsule; Therapy: (Recorded:22Nov2017) to Recorded   4  Vicodin TABS; Therapy: (Recorded:22Nov2017) to Recorded  Medication List Reviewed: The medication list was reviewed and updated today  Allergies  1  Percocet TABS    Vitals  Vital Signs    Recorded: 22Nov2017 11:27AM   Heart Rate 80   Systolic 540   Diastolic 70   Height 5 ft 4 in   Weight 137 lb 2 oz   BMI Calculated 23 54   BSA Calculated 1 67       Physical Exam   Constitutional  General appearance: No acute distress, well appearing and well nourished  Pulmonary  Respiratory effort: No increased work of breathing or signs of respiratory distress  -- clear to auscultation bilaterally  Cardiovascular regular rate and rhythm  Examination of extremities for edema and/or varicosities: Normal    Abdomen no CVA tenderness  Musculoskeletal  Gait and station: Normal    Skin  Skin and subcutaneous tissue: Normal without rashes or lesions  Additional Exam:  no focal neurologic deficits  Mood and affect appropriate        Results/Data  Urine Dip Non-Automated- POC [id number] 11:27AM Hoda Blanchardvicenta     Test Name Result Flag Reference   [facility]       Clarity Transparent     Leukocytes -     Nitrite -     Blood +     Protein ++     Ph 5 0     Specific Gravity 1 025     Ketone -     Glucose -           Signatures   Electronically signed by : Julianne Rueda, ; Nov 22 2017  1:06PM EST                       (Author) Electronically signed by : Sushma Méndez MD; Nov 23 2017 10:10PM EST

## 2017-12-01 ENCOUNTER — ANESTHESIA EVENT (OUTPATIENT)
Dept: PERIOP | Facility: AMBULARY SURGERY CENTER | Age: 57
End: 2017-12-01

## 2017-12-01 ENCOUNTER — GENERIC CONVERSION - ENCOUNTER (OUTPATIENT)
Dept: OTHER | Facility: OTHER | Age: 57
End: 2017-12-01

## 2017-12-04 ENCOUNTER — HOSPITAL ENCOUNTER (OUTPATIENT)
Facility: AMBULARY SURGERY CENTER | Age: 57
Setting detail: OUTPATIENT SURGERY
Discharge: HOME | End: 2017-12-04
Attending: UROLOGY | Admitting: UROLOGY

## 2017-12-04 ENCOUNTER — APPOINTMENT (OUTPATIENT)
Dept: RADIOLOGY | Facility: AMBULARY SURGERY CENTER | Age: 57
End: 2017-12-04

## 2017-12-04 ENCOUNTER — ANESTHESIA (OUTPATIENT)
Dept: PERIOP | Facility: AMBULARY SURGERY CENTER | Age: 57
End: 2017-12-04

## 2017-12-04 VITALS
HEIGHT: 64 IN | WEIGHT: 130 LBS | RESPIRATION RATE: 16 BRPM | SYSTOLIC BLOOD PRESSURE: 124 MMHG | HEART RATE: 86 BPM | OXYGEN SATURATION: 97 % | BODY MASS INDEX: 22.2 KG/M2 | TEMPERATURE: 98 F | DIASTOLIC BLOOD PRESSURE: 74 MMHG

## 2017-12-04 DIAGNOSIS — N20.0 NEPHROLITHIASIS: ICD-10-CM

## 2017-12-04 PROCEDURE — 82360 CALCULUS ASSAY QUANT: CPT | Performed by: UROLOGY

## 2017-12-04 PROCEDURE — C2617 STENT, NON-COR, TEM W/O DEL: HCPCS | Performed by: UROLOGY

## 2017-12-04 PROCEDURE — C1758 CATHETER, URETERAL: HCPCS | Performed by: UROLOGY

## 2017-12-04 PROCEDURE — 88300 SURGICAL PATH GROSS: CPT | Performed by: UROLOGY

## 2017-12-04 PROCEDURE — 74420 UROGRAPHY RTRGR +-KUB: CPT

## 2017-12-04 PROCEDURE — C1769 GUIDE WIRE: HCPCS | Performed by: UROLOGY

## 2017-12-04 DEVICE — STENT URETERAL 6FR 24CM INLAY OPTIMA: Type: IMPLANTABLE DEVICE | Site: URETER | Status: FUNCTIONAL

## 2017-12-04 RX ORDER — PROPOFOL 10 MG/ML
INJECTION, EMULSION INTRAVENOUS AS NEEDED
Status: DISCONTINUED | OUTPATIENT
Start: 2017-12-04 | End: 2017-12-04 | Stop reason: SURG

## 2017-12-04 RX ORDER — NICOTINE 21 MG/24HR
1 PATCH, TRANSDERMAL 24 HOURS TRANSDERMAL DAILY
Status: DISCONTINUED | OUTPATIENT
Start: 2017-12-04 | End: 2017-12-04 | Stop reason: HOSPADM

## 2017-12-04 RX ORDER — MIDAZOLAM HYDROCHLORIDE 1 MG/ML
INJECTION INTRAMUSCULAR; INTRAVENOUS AS NEEDED
Status: DISCONTINUED | OUTPATIENT
Start: 2017-12-04 | End: 2017-12-04 | Stop reason: SURG

## 2017-12-04 RX ORDER — ONDANSETRON 2 MG/ML
4 INJECTION INTRAMUSCULAR; INTRAVENOUS EVERY 6 HOURS PRN
Status: DISCONTINUED | OUTPATIENT
Start: 2017-12-04 | End: 2017-12-04 | Stop reason: HOSPADM

## 2017-12-04 RX ORDER — HYDROCODONE BITARTRATE AND ACETAMINOPHEN 5; 325 MG/1; MG/1
2 TABLET ORAL EVERY 6 HOURS PRN
Qty: 20 TABLET | Refills: 0 | Status: SHIPPED | OUTPATIENT
Start: 2017-12-04 | End: 2017-12-07

## 2017-12-04 RX ORDER — FENTANYL CITRATE/PF 50 MCG/ML
25 SYRINGE (ML) INJECTION
Status: DISCONTINUED | OUTPATIENT
Start: 2017-12-04 | End: 2017-12-04 | Stop reason: HOSPADM

## 2017-12-04 RX ORDER — TAMSULOSIN HYDROCHLORIDE 0.4 MG/1
0.4 CAPSULE ORAL
Qty: 30 CAPSULE | Refills: 0 | Status: SHIPPED | OUTPATIENT
Start: 2017-12-04

## 2017-12-04 RX ORDER — CEPHALEXIN 500 MG/1
500 CAPSULE ORAL EVERY 12 HOURS SCHEDULED
Qty: 6 CAPSULE | Refills: 0 | Status: SHIPPED | OUTPATIENT
Start: 2017-12-04 | End: 2017-12-07

## 2017-12-04 RX ORDER — LIDOCAINE HYDROCHLORIDE 10 MG/ML
INJECTION, SOLUTION INFILTRATION; PERINEURAL AS NEEDED
Status: DISCONTINUED | OUTPATIENT
Start: 2017-12-04 | End: 2017-12-04 | Stop reason: SURG

## 2017-12-04 RX ORDER — MAGNESIUM HYDROXIDE 1200 MG/15ML
LIQUID ORAL AS NEEDED
Status: DISCONTINUED | OUTPATIENT
Start: 2017-12-04 | End: 2017-12-04 | Stop reason: HOSPADM

## 2017-12-04 RX ORDER — SODIUM CHLORIDE 9 MG/ML
INJECTION, SOLUTION INTRAVENOUS
Status: DISCONTINUED | OUTPATIENT
Start: 2017-12-04 | End: 2017-12-04 | Stop reason: HOSPADM

## 2017-12-04 RX ORDER — OXYBUTYNIN CHLORIDE 5 MG/1
5 TABLET ORAL 3 TIMES DAILY PRN
Status: DISCONTINUED | OUTPATIENT
Start: 2017-12-04 | End: 2017-12-04 | Stop reason: HOSPADM

## 2017-12-04 RX ORDER — FENTANYL CITRATE 50 UG/ML
INJECTION, SOLUTION INTRAMUSCULAR; INTRAVENOUS AS NEEDED
Status: DISCONTINUED | OUTPATIENT
Start: 2017-12-04 | End: 2017-12-04 | Stop reason: SURG

## 2017-12-04 RX ORDER — ONDANSETRON 2 MG/ML
4 INJECTION INTRAMUSCULAR; INTRAVENOUS ONCE AS NEEDED
Status: DISCONTINUED | OUTPATIENT
Start: 2017-12-04 | End: 2017-12-04 | Stop reason: HOSPADM

## 2017-12-04 RX ORDER — OXYBUTYNIN CHLORIDE 5 MG/1
5 TABLET ORAL 3 TIMES DAILY
Qty: 90 TABLET | Refills: 0 | Status: SHIPPED | OUTPATIENT
Start: 2017-12-04

## 2017-12-04 RX ORDER — SODIUM CHLORIDE 9 MG/ML
INJECTION, SOLUTION INTRAVENOUS CONTINUOUS PRN
Status: DISCONTINUED | OUTPATIENT
Start: 2017-12-04 | End: 2017-12-04 | Stop reason: SURG

## 2017-12-04 RX ORDER — EPHEDRINE SULFATE 50 MG/ML
INJECTION, SOLUTION INTRAVENOUS AS NEEDED
Status: DISCONTINUED | OUTPATIENT
Start: 2017-12-04 | End: 2017-12-04 | Stop reason: SURG

## 2017-12-04 RX ORDER — HYDROCODONE BITARTRATE AND ACETAMINOPHEN 5; 325 MG/1; MG/1
2 TABLET ORAL EVERY 6 HOURS PRN
Status: DISCONTINUED | OUTPATIENT
Start: 2017-12-04 | End: 2017-12-04 | Stop reason: HOSPADM

## 2017-12-04 RX ORDER — DOCUSATE SODIUM 100 MG/1
100 CAPSULE, LIQUID FILLED ORAL 3 TIMES DAILY
Qty: 90 CAPSULE | Refills: 0 | Status: SHIPPED | OUTPATIENT
Start: 2017-12-04

## 2017-12-04 RX ADMIN — HYDROCODONE BITARTRATE AND ACETAMINOPHEN 2 TABLET: 5; 325 TABLET ORAL at 12:04

## 2017-12-04 RX ADMIN — FENTANYL CITRATE 25 MCG: 50 INJECTION, SOLUTION INTRAMUSCULAR; INTRAVENOUS at 10:00

## 2017-12-04 RX ADMIN — EPHEDRINE SULFATE 15 MG: 50 INJECTION, SOLUTION INTRAMUSCULAR; INTRAVENOUS; SUBCUTANEOUS at 10:17

## 2017-12-04 RX ADMIN — FENTANYL CITRATE 25 MCG: 50 INJECTION INTRAMUSCULAR; INTRAVENOUS at 11:15

## 2017-12-04 RX ADMIN — LIDOCAINE HYDROCHLORIDE 50 MG: 10 INJECTION, SOLUTION INFILTRATION; PERINEURAL at 09:47

## 2017-12-04 RX ADMIN — PROPOFOL 150 MG: 10 INJECTION, EMULSION INTRAVENOUS at 09:47

## 2017-12-04 RX ADMIN — CEFAZOLIN SODIUM 1000 MG: 1 SOLUTION INTRAVENOUS at 09:49

## 2017-12-04 RX ADMIN — MIDAZOLAM HYDROCHLORIDE 2 MG: 1 INJECTION, SOLUTION INTRAMUSCULAR; INTRAVENOUS at 09:42

## 2017-12-04 RX ADMIN — FENTANYL CITRATE 25 MCG: 50 INJECTION, SOLUTION INTRAMUSCULAR; INTRAVENOUS at 10:11

## 2017-12-04 RX ADMIN — FENTANYL CITRATE 25 MCG: 50 INJECTION, SOLUTION INTRAMUSCULAR; INTRAVENOUS at 09:47

## 2017-12-04 RX ADMIN — FENTANYL CITRATE 25 MCG: 50 INJECTION INTRAMUSCULAR; INTRAVENOUS at 11:07

## 2017-12-04 RX ADMIN — FENTANYL CITRATE 25 MCG: 50 INJECTION, SOLUTION INTRAMUSCULAR; INTRAVENOUS at 10:05

## 2017-12-04 RX ADMIN — FENTANYL CITRATE 25 MCG: 50 INJECTION INTRAMUSCULAR; INTRAVENOUS at 11:11

## 2017-12-04 RX ADMIN — SODIUM CHLORIDE: 0.9 INJECTION, SOLUTION INTRAVENOUS at 08:50

## 2017-12-04 RX ADMIN — FENTANYL CITRATE 25 MCG: 50 INJECTION INTRAMUSCULAR; INTRAVENOUS at 11:19

## 2017-12-04 NOTE — ANESTHESIA POSTPROCEDURE EVALUATION
Post-Op Assessment Note      CV Status:  Stable    Mental Status:  Alert and awake    Hydration Status:  Euvolemic    PONV Controlled:  Controlled    Airway Patency:  Patent and adequate    Post Op Vitals Reviewed: Yes          Staff: CRNA       Comments: Airway reflexes intact          BP     Temp      Pulse     Resp      SpO2

## 2017-12-04 NOTE — OP NOTE
OPERATIVE REPORT  PATIENT NAME: Yanique Vieira    :  1960  MRN: [id number]  Pt Location: [facility] OR ROOM 06    SURGERY DATE: 2017    Surgeon(s) and Role:     * Adriana Land MD - Primary    Preop Diagnosis:  Nephrolithiasis [N20 0]  Proximal left ureteral stone, lower pole ureteral stone, total stone burden over 2 centimeters    Post-Op Diagnosis Codes:     * Nephrolithiasis [N20 0]  Proximal left ureteral stone, lower pole ureteral stone, total stone burden over 2 centimeters    Procedure(s) (LRB):  CYSTOSCOPY; URETEROSCOPY; HOLMIUM LASER LITHOTRIPSY; BASKET STONE EXTRACTION; RETROGRADE PYELOGRAM; STENT (Left), extensive    Specimen(s):  ID Type Source Tests Collected by Time Destination   1 : Left ureteral stone Calculus Ureter, Left STONE ANALYSIS, TISSUE EXAM Adriana Land MD 2017 1016        Estimated Blood Loss:   Minimal    Drains:  6 Slovak by 24 centimeter left ureteral stent       Anesthesia Type:   General    Operative Indications:  Nephrolithiasis [N20 0]  Left hydronephrosis  Total stone burden greater than 2 centimeters    Operative Findings:  Uncomplicated cystoscopy and left ureteroscopy with extensive laser lithotripsy and stone basketing  End of case retrograde pyelogram showed no extravasation and no filling defects  The stones were radiopaque prior to laser lithotripsy  Six Western Page by 24 centimeter left ureteral stent left without a string      Complications:   None    Procedure and Technique:    PROCEDURES PERFORMED:  1) Cystoscopy  2) Left retrograde pyelography with fluoroscopic interpretation  3) Left ureteroscopy with laser lithotripsy and basket extraction of stone  4) Left ureteral stent placement (6F x 24cm    SURGEON:  Adriana Land MD    ASSISTANTS:  None    NOTE:  There were no qualified teaching residents to assist with this case    ANESTHESIA: General     COMPLICATIONS:   None    ANTIBIOTICS:  Cefazolin    INTRAOPERATIVE THROMBOEMBOLISM PROPHYLAXIS:  Pneumatic compression stockings         FINDINGS:    1  The Left calculi were radiopaque on plain fluoroscopy, as indicated by to radiopaque shadows in the left lower pole of the collecting system  2  Retrograde pyelogram was performed at the end of the case on the Left side using a 5 Fr open ended catheter  10 of 100 % Omnipaque was injected  3  The following findings were noted: No extravasation and Mild hydroureteronephrosis  Mildly dilated calyces  No filling defects  Contrast drained out of the collecting system  INDICATIONS FOR PROCEDURE:  Parisa Weinberg is an 62 y o  old female with Left ureteral and renal calculus, she is status post left stent placement for decompression of her left kidney some weeks ago  After discussing the options for treatment, including medical expulsive therapy, extracorporeal shockwave lithotripsy, and ureteroscopy, the patient elected to undergo ureteroscopy and ureteral stent placement  We discussed the procedure in detail, the alternatives, and the risks, and they signed informed consent to proceed (these are outlined in the surgical consent form)  PROCEDURE IN DETAIL:     The patient was identified by name, date of birth, and MRN  and brought to the OR  Antibiotic prophylaxis and DVT prophylaxis were administered as per the guidelines  They were placed in the dorsal lithotomy position with care to pad all pressure points  They were prepped and draped in the usual sterile fashion  A surgical time out was performed with all in the room in agreement with the correct patient, procedure, indications, and laterality  A 21-Irish rigid cystoscope was used to enter the bladder  The bladder was inspected in its entirety and there were no lesions noted  The ureteral orifices were identified in their normal orthotopic positions  The Left ureteral orifice was identified and a 5 Fr open ended catheter was placed into the ureteral orifice       A Solo wire was advanced up to the kidney under fluoroscopic guidance  Leaving this safety wire in place, the bladder was drained  A second working wire was then placed and over this a 12/14 ureteral access sheath was sequentially passed under fluoroscopic guidance  A Digital flexible ureteral scope was placed through the access sheath into the collecting system and systematic ureteroscopy was performed        The stones were encountered in the Lower pole of the left kidney location  The stone was not noted to be impacted  A holmium laser fiber was passed through the ureteroscope and laser lithotripsy was commenced at settings of 1 J and 10 Hz  The stones were fragmented to very small pieces and dust       Once we were satisfied that the stone was fragmented to dust, a 1 9 Western Page zero tip nitinol basket was passed through the ureteroscope  The residual fragments were basketed out and removed  The ureteroscope was backed down the ureter under vision and there were no residual fragments and the ureter was noted to be intact with no injury and Moderate edema where the ureteral access sheath had been located  A 6 Telugu by 24 centimeter left JJ stent was then passed up the wire  under fluoroscopic guidance into the kidney with a good curl noted in the kidney and in the bladder  The stent string was removed  The bladder was drained  All instrument counts and sponge counts were correct  The patient was placed back into the supine position, awakened from general anesthesia and brought to recovery room in stable condition  ESTIMATED BLOOD LOSS:  Minimal      DRAINS:  6 Western Page by 24 centimeter ureteral stent left    SPECIMENS:     Order Name Source Comment Collection Info Order Time   STONE ANALYSIS Ureter, Left  Collected By: Dariel Justin MD 12/4/2017 10:27 AM   TISSUE EXAM Ureter, Left  Collected By: Dariel Justin MD 12/4/2017 10:27 AM        IMPLANTS:     Implant Name Type Inv   Item Serial No   Lot No  LRB No  Used   STENT URET DBL PIGTAIL TPR 6FR 24CM ULTRA [facility] - [id number]   STENT URET DBL PIGTAIL TPR 6FR 24CM ULTRA [facility] [id number] Left 1        COMPLICATIONS:  None    DISPOSITION: PACU     PLAN:  The plan going forward will be for to have a cystoscopy and left ureteral stent removal in the urology office in approximately 2 weeks  Following that she will need a KUB and a renal ultrasound 6 weeks following  We will also perform stone counseling at her next visit         I was present for the entire procedure and A qualified resident physician was not available    Patient Disposition:  PACU     SIGNATURE: Tripp Brown MD  DATE: December 4, 2017  TIME: 10:50 AM

## 2017-12-04 NOTE — ANESTHESIA PREPROCEDURE EVALUATION
Review of Systems/Medical History  Patient summary reviewed  Chart reviewed      Cardiovascular  Exercise tolerance: good,  No hypertension , No CAD, ,    Pulmonary  Smoker more than 10 packs per year , Tobacco cessation counseling given, No asthma: , No recent URI , ,        GI/Hepatic       Kidney stones,        Endo/Other     GYN       Hematology   Musculoskeletal       Neurology   Psychology           Physical Exam    Airway    Mallampati score: II  TM Distance: >3 FB       Dental       Cardiovascular      Pulmonary      Other Findings        Anesthesia Plan  ASA Score- 2       Anesthesia Type- general with ASA Monitors  Additional Monitors:   Airway Plan: LMA  Comment:  ANKUSH Acosta , have personally seen and evaluated the patient prior to anesthetic care  I have reviewed the pre-anesthetic record, and other medical records if appropriate to the anesthetic care  If a CRNA is involved in the case, I have reviewed the CRNA assessment, if present, and agree  Risks/benefits and alternatives discussed with patient including possible PONV, sore throat, and possibility of rare anesthetic and surgical emergencies          Induction- intravenous  Informed Consent- Anesthetic plan and risks discussed with patient  I personally reviewed this patient with the CRNA  Discussed and agreed on the Anesthesia Plan with the CRNA  Zaira Coombs

## 2017-12-04 NOTE — DISCHARGE INSTRUCTIONS
Ureteroscopy   WHAT YOU NEED TO KNOW:     Daysi Goldstein,  Today you had a cystoscopy and left ureteroscopy with laser lithotripsy and extensive stone basketing for 2 large stones that were in the lower pole of your left kidney  The stone that was originally in your ureter had bounced into the basement of the kidney, what we call the lower pole, and I was able to break these up completely into fragments and small dust   I did use a basket to remove multiple fragments and these were sent for specimen  Your stones are radiopaque, which means that when we used x-ray camera they cast shadows  Whereas at the beginning of the case you had 2 approximately 1 centimeter shadows over the lower pole of your left kidney, at the end of the case you had no shadowing stones meaning that you are now stone free in combination with our ureteroscopic visualization of your kidney and collecting system  This means that you will only pass sand and small fragments  Unfortunately, you did have a great amount of swelling in your ureter after we removed the stone fragments and as such we had to leave you with a ureteral stent  I know that you did not like the stent, however it is a necessary evil to drain your kidney and to allow you to heal properly  My office will contact you in the coming days to week to schedule an appointment for cystoscopy and ureteral stent removal in the office  At that visit we will also talk about other changes you can make in your lifestyle to decrease your future stone risk  If you have any questions, please do not hesitate to call my office  Alternatively, you can write these questions down we can discuss them in person  Take care and be well,  Dr Zoraida Wells  A ureteroscopy is a procedure to examine in the inside of your urinary tract, which includes your urethra, bladder, ureters, and kidneys  A ureteroscope is a small, thin tube with a light and camera on the end   Ureteroscopy can help your healthcare provider diagnose and treat problems in your urinary tract, such as kidney stones  DISCHARGE INSTRUCTIONS:   Medicine:   · Antibiotics  may be given to treat or prevent an infection  · Take your medicine as directed  Contact your healthcare provider if you think your medicine is not helping or if you have side effects  Tell him or her if you are allergic to any medicine  Keep a list of the medicines, vitamins, and herbs you take  Include the amounts, and when and why you take them  Bring the list or the pill bottles to follow-up visits  Carry your medicine list with you in case of an emergency  Follow up with your healthcare provider as directed:  Write down your questions so you remember to ask them during your visits  Drink liquids as directed  Liquids can help prevent kidney stones and urinary tract infections  Drink water and limit the amount of caffeine you drink  Caffeine may be found in coffee, tea, soda, sports drinks, and foods  Ask your healthcare provider how much liquid to drink each day  Contact your healthcare provider if:   · You have a fever  · You cannot urinate  · You have blood in your urine  · You are vomiting  · You have pain in your abdomen or side  · You have questions or concerns about your condition or care  © 2017 [employer] [facility] Information is for End User's use only and may not be sold, redistributed or otherwise used for commercial purposes  All illustrations and images included in CareNotes® are the copyrighted property of [employer]  or [employer]  The above information is an  only  It is not intended as medical advice for individual conditions or treatments  Talk to your doctor, nurse or pharmacist before following any medical regimen to see if it is safe and effective for you

## 2017-12-13 LAB
CA PHOS MFR STONE: 15 %
CALCIUM OXALATE DIHYDRATE MFR STONE IR: 30 %
COLOR STONE: NORMAL
COM MFR STONE: 55 %
COMMENT-STONE3: NORMAL
COMPOSITION: NORMAL
LABORATORY COMMENT REPORT: NORMAL
NIDUS STONE QL: NORMAL
PHOTO: NORMAL
SIZE STONE: NORMAL MM
STONE ANALYSIS-IMP: NORMAL
STONE ANALYSIS-IMP: NORMAL
WT STONE: 167 MG

## 2017-12-19 ENCOUNTER — ALLSCRIPTS OFFICE VISIT (OUTPATIENT)
Dept: OTHER | Facility: OTHER | Age: 57
End: 2017-12-19

## 2017-12-20 NOTE — PROCEDURES
Assessment  1  Hydronephrosis, left (591) (N13 30)   2  Left ureteral stone (592 1) (N20 1)   3  Nephrolithiasis (592 0) (N20 0)    Plan  Left ureteral stone    · Cysto w/stent removal - POC; Status:Complete;   Done: [id number]   Perform: In Office; Due:[id number]; Last Updated Dimitri Kinggilmar; 2017 10:17:17 AM;Ordered; For:Left ureteral stone; Ordered By:Jules Pino;  Nephrolithiasis    · * XR ABDOMEN 1 VIEW KUB; Status:Active; Requested POM:[id number]; Perform:[facility]; CQN:[id number];[id number];GKV:[id number]; Ordered By:Javier Pino;   · US KIDNEY AND BLADDER; Status:Active; Requested RTH:[id number]; Perform:[facility]; [id number]; Last Updated Talya Bowles; 2017 10:40:08 AM;Ordered;For:Nephrolithiasis; Ordered By:Jules Pino;   · Follow-up visit in 6 weeks Evaluation and Treatment  Follow-up with advancedpractitioner in 6 weeks for review of KUB and renal ultrasound  Status: Complete Done: [id number]   Ordered; For: Nephrolithiasis; Ordered By: Apolinar Gautam Performed:  Due: 96DGA9061; Last Updated By: Yonathan Lee; 2017 10:39:53 AM    Discussion/Summary  Discussion Summary:   Mo Prado did well with her cystoscopy and ureteral stent removal today  I did tell her she may have some pain and squeezing sensation in her left hand side and this is normal after stent removal We also had a stone counseling visit today in which we discussed her risk factors for stone formation as follows: We discussed at length the imaging findings as well as the pathophysiology of recurrent stone formation and the future risk of stone formation and symptomatic stone episodes  I explained to her that there is at least a 50% chance of developing recurrent stone episodes over the next 10 years  We then discussed risk factors for stone growth and stone formation and also behavioral modification to decrease stone risk   I counseled her to adjust fluid intake to make at least 2 liters of urine daily which will require the intake of approximately 1 gallon of fluid orally every day  I told her as well that she is to look at hydration as a form of medical therapy as opposed to just drinking fluids  She understands that if these recommendations are followed that the risk of recurrent stones will be approximately 20-25% as opposed to 50% over the next 10 years  We discussed increasing citrate containing foods and beverages in the diet  Specifically, adding lemon juice to water intake and also diet crystal light was recommended as these contain citrate as well  If she is to take sodas that she should take diet clear sodas which also contain citrate (diet Sprite, Fresca, and diet [employer])  She is encouraged to decrease salt and animal protein intake while increasing fruit and vegetable intake (specifically avoiding vegetables with a high oxalate content)  We then addressed calcium calcium in the diet and I counseled her that severely limiting calcium intake or limiting calcium intake will actually increase recurrent stone risk and she is encouraged to take a normal amount of calcium via whole food intake  I discouraged her from taking excessive amounts of calcium carbonate should he have any gastroesophageal reflux symptoms and also discouraged her from taking vitamin-D calcium supplementation unnecessarily  I did  her on warning signs of gross hematuria, flank pain, malaise, nausea and vomiting and she will either advise our office of the symptoms or present to the emergency room  The plan going forward will be to see her back in 6 weeks with a KUB and renal ultrasound  If those are normal and she does not have symptoms she will follow on an as-needed basis as she has had concerns about her insurance coverage in the past    Medication SE Review and Pt Understands Tx: The treatment plan was reviewed with the patient/guardian   The patient/guardian understands and agrees with the treatment plan Understands and agrees with treatment plan: The treatment plan was reviewed with the patient/guardian  The patient/guardian understands and agrees with the treatment plan   Counseling Documentation With Imm: The patient was counseled regarding diagnostic results,-- instructions for management,-- risk factor reductions,-- prognosis,-- patient and family education,-- impressions,-- risks and benefits of treatment options,-- importance of compliance with treatment  Chief Complaint  Chief Complaint Free Text Note Form: cysto/stent removal s/p left URS 12/4/17      History of Present Illness  HPI: Ambreen Coombs presents today for cystoscopy and ureteral stent removal as well as for a stone counseling visit  She has done well since surgery and her stone was seen to be 85% calcium oxalate with a mixture of calcium oxalate monohydrate and dihydrate  She did have a 15% calcium phosphate component to her stone and no nidus was identified  Her review of systems is negative today she has no fever or malaise and no other signs of infection  She has had some stent colic and she is excited to have her stent removed  Informed consent is on the chart and she wishes to proceed with the procedure      Review of Systems  Complete-Female Urology:  Constitutional: No fever, no chills, feels well, no tiredness, no recent weight gain or weight loss  Respiratory: No complaints of shortness of breath, no wheezing, no cough, no SOB on exertion, no orthopnea, no PND  Cardiovascular: No complaints of slow heart rate, no fast heart rate, no chest pain, no palpitations, no leg claudication, no lower extremity edema  Gastrointestinal: No complaints of abdominal pain, no constipation, no nausea or vomiting, no diarrhea, no bloody stools  Genitourinary: Some stent colic, but-- as noted in HPI  Musculoskeletal: No complaints of arthralgias, no myalgias, no joint swelling or stiffness, no limb pain or swelling    Integumentary: No complaints of skin rash or lesions, no itching, no skin wounds, no breast pain or lump  Hematologic/Lymphatic: No complaints of swollen glands, no swollen glands in the neck, does not bleed easily, does not bruise easily  Neurological: No complaints of headache, no confusion, no convulsions, no numbness, no dizziness or fainting, no tingling, no limb weakness, no difficulty walking  ROS Reviewed:   ROS reviewed  Active Problems  1  Constipation (564 00) (K59 00)   2  Dysuria (788 1) (R30 0)   3  Hydronephrosis, left (591) (N13 30)   4  Left ureteral stone (592 1) (N20 1)    Past Medical History  Active Problems And Past Medical History Reviewed: The active problems and past medical history were reviewed and updated today  Surgical History  1  History of Cystoscopy With Insertion Of Ureteral Stent  Surgical History Reviewed: The surgical history was reviewed and updated today  Family History  Mother    1  Family history of cerebrovascular accident (CVA) (V17 1) (Z82 3)  Father    2  Family history of cardiac disorder (V17 49) (Z82 49)  Family History Reviewed: The family history was reviewed and updated today  Social History   · Current every day smoker (305 1) (F17 200)   · No alcohol use   · No illicit drug use   · Unemployed (V62 0) (Z56 0)   ·  (V61 07) (Z63 4)  Social History Reviewed: The social history was reviewed and updated today  The social history was reviewed and is unchanged  Current Meds   1  Hydrocodone-Acetaminophen 5-325 MG Oral Tablet; TAKE 1 TABLET EVERY 6 HOURS AS NEEDED; Therapy: 82OLE0940 to (Evaluate:25Nov2017); Last Rx:22Nov2017 Ordered   2  Oxybutynin Chloride 5 MG Oral Tablet; Therapy: (Recorded:22Nov2017) to Recorded   3  Phenazopyridine HCl - 200 MG Oral Tablet; Therapy: (Recorded:22Nov2017) to Recorded   4  Pyridium 200 MG Oral Tablet; Take 1 tablet po TID prn; Therapy: 60OZO5586 to (Last Rx:22Nov2017) Ordered   5   Tamsulosin HCl - 0 4 MG Oral Capsule; Therapy: (Recorded:22Nov2017) to Recorded   6  Vicodin TABS; Therapy: (Recorded:22Nov2017) to Recorded  Medication List Reviewed: The medication list was reviewed and updated today  Allergies  1  Percocet TABS    Vitals  Vital Signs    Recorded: 83DMB8923 85:56AF   Systolic 683   Diastolic 70   Height 5 ft 4 in   Weight 133 lb    BMI Calculated 22 83   BSA Calculated 1 64     Physical Exam   Constitutional Alert, oriented, normal mood and affect, appears nontoxic  Head and Face  Head and face: Normal    Eyes  Conjunctiva and lids: No swelling, erythema or discharge  Pupils and irises: Equal, round, reactive to light  Ears, Nose, Mouth, and Throat  External inspection of ears and nose: Normal    Nasal mucosa, septum, and turbinates: Normal without edema or erythema  Neck  Neck: Supple, symmetric, trachea midline, no masses  Pulmonary  Respiratory effort: No increased work of breathing or signs of respiratory distress  Palpation of chest: Normal    Cardiovascular  Peripheral vascular exam: Normal    Examination of extremities for edema and/or varicosities: Normal    Chest  Chest: Normal    Abdomen  Abdomen: Non-tender, no masses  Liver and spleen: No hepatomegaly or splenomegaly  Examination for hernias: No hernia appreciated  Genitourinary  External genitalia and vagina: Normal, no lesions appreciated  Urethra: Normal, no discharge  Bladder: Not distended, no tenderness  Lymphatic  Palpation of lymph nodes in groin: No lymphadenopathy  Musculoskeletal  Gait and station: Normal    Digits and nails: Normal without clubbing or cyanosis  Joints, bones, and muscles: Normal    Muscle strength/tone: Normal    Skin  Skin and subcutaneous tissue: Normal without rashes or lesions  Neurologic  Cranial nerves: Cranial nerves II-XII intact  Cortical function: Normal mental status  Sensation: No sensory loss  Coordination: Normal finger to nose and heel to shin     Psychiatric  Judgment and insight: Normal    Orientation to person, place, and time: Normal    Recent and remote memory: Intact  Mood and affect: Normal        Procedure     Antibiotic prophylaxis: Keflex  Procedure: Cystoscopy / Stent Removal  After consent was obtained, the patient was placed in the supine position and prepped in the usual fashion  Flexible cystourethroscopy was performed and the indwelling left ureteral stent was identified  The flexible grasping forceps were then used to remove the stent without difficulty  The patient tolerated the procedure well and without complications         Signatures   Electronically signed by : ANKUSH Cai ; Dec 19 2017 10:41AM EST                       (Author)

## 2018-01-12 VITALS
SYSTOLIC BLOOD PRESSURE: 110 MMHG | HEIGHT: 64 IN | HEART RATE: 80 BPM | DIASTOLIC BLOOD PRESSURE: 70 MMHG | BODY MASS INDEX: 23.41 KG/M2 | WEIGHT: 137.13 LBS

## 2018-01-23 VITALS
BODY MASS INDEX: 22.71 KG/M2 | SYSTOLIC BLOOD PRESSURE: 100 MMHG | WEIGHT: 133 LBS | DIASTOLIC BLOOD PRESSURE: 70 MMHG | HEIGHT: 64 IN

## 2018-01-23 NOTE — MISCELLANEOUS
Message  I called Aleta Lerma to discuss her procedure on monday and to see if she had any questions about this  She is nervous but ready for surgery   She has had some intermittent hematuria which is sometimes seen with stent placement and after stent placement      Signatures   Electronically signed by : ANKUSH Dawn ; Dec  1 2017 11:58AM EST                       (Author)

## 2018-01-30 DIAGNOSIS — N20.0 CALCULUS OF KIDNEY: ICD-10-CM

## 2021-03-25 ENCOUNTER — IMMUNIZATIONS (OUTPATIENT)
Dept: FAMILY MEDICINE CLINIC | Facility: HOSPITAL | Age: 61
End: 2021-03-25

## 2021-03-25 DIAGNOSIS — Z23 ENCOUNTER FOR IMMUNIZATION: Primary | ICD-10-CM

## 2021-03-25 PROCEDURE — 0001A SARS-COV-2 / COVID-19 MRNA VACCINE (PFIZER-BIONTECH) 30 MCG: CPT

## 2021-03-25 PROCEDURE — 91300 SARS-COV-2 / COVID-19 MRNA VACCINE (PFIZER-BIONTECH) 30 MCG: CPT

## 2021-04-15 ENCOUNTER — IMMUNIZATIONS (OUTPATIENT)
Dept: FAMILY MEDICINE CLINIC | Facility: HOSPITAL | Age: 61
End: 2021-04-15

## 2021-04-15 DIAGNOSIS — Z23 ENCOUNTER FOR IMMUNIZATION: Primary | ICD-10-CM

## 2021-04-15 PROCEDURE — 0002A SARS-COV-2 / COVID-19 MRNA VACCINE (PFIZER-BIONTECH) 30 MCG: CPT

## 2021-04-15 PROCEDURE — 91300 SARS-COV-2 / COVID-19 MRNA VACCINE (PFIZER-BIONTECH) 30 MCG: CPT

## 2022-02-19 ENCOUNTER — IMMUNIZATIONS (OUTPATIENT)
Dept: FAMILY MEDICINE CLINIC | Facility: HOSPITAL | Age: 62
End: 2022-02-19

## 2022-02-19 PROCEDURE — 91305 COVID-19 PFIZER VACC TRIS-SUCROSE GRAY CAP 0.3 ML: CPT

## 2022-02-19 PROCEDURE — 0051A COVID-19 PFIZER VACC TRIS-SUCROSE GRAY CAP 0.3 ML: CPT

## (undated) DEVICE — PACK TUR

## (undated) DEVICE — GLOVE INDICATOR PI UNDERGLOVE SZ 8 BLUE

## (undated) DEVICE — GUIDEWIRE STRGHT TIP 0.035 IN  SOLO PLUS

## (undated) DEVICE — UROCATCH BAG

## (undated) DEVICE — CATH URETERAL 5FR X 70 CM FLEX TIP POLYUR BARD

## (undated) DEVICE — BASKET SPECIMEN RETRIVAL 1.9FR 120CM

## (undated) DEVICE — STERILE SURGICAL LUBRICANT,  TUBE: Brand: SURGILUBE

## (undated) DEVICE — SCD SEQUENTIAL COMPRESSION COMFORT SLEEVE MEDIUM KNEE LENGTH: Brand: KENDALL SCD

## (undated) DEVICE — SHEATH URETERAL ACCESS 12/14FR 35CM PROXIS

## (undated) DEVICE — GLOVE SRG BIOGEL ECLIPSE 7.5

## (undated) DEVICE — 200 MICRON SINGLE-USE HOLMIUM FIBER ASSEMBLY WITH FLAT TIP: Brand: OPTILITE

## (undated) DEVICE — SPECIMEN CONTAINER STERILE PEEL PACK

## (undated) DEVICE — INVIEW CLEAR LEGGINGS: Brand: CONVERTORS